# Patient Record
Sex: FEMALE | Race: WHITE | Employment: PART TIME | URBAN - METROPOLITAN AREA
[De-identification: names, ages, dates, MRNs, and addresses within clinical notes are randomized per-mention and may not be internally consistent; named-entity substitution may affect disease eponyms.]

---

## 2018-05-21 ENCOUNTER — TELEPHONE (OUTPATIENT)
Dept: SURGERY | Age: 63
End: 2018-05-21

## 2018-06-15 ENCOUNTER — TELEPHONE (OUTPATIENT)
Dept: SURGERY | Age: 63
End: 2018-06-15

## 2019-04-19 ENCOUNTER — APPOINTMENT (OUTPATIENT)
Dept: GENERAL RADIOLOGY | Age: 64
End: 2019-04-19

## 2019-04-19 ENCOUNTER — HOSPITAL ENCOUNTER (EMERGENCY)
Age: 64
Discharge: HOME | End: 2019-04-19

## 2019-04-19 ENCOUNTER — APPOINTMENT (OUTPATIENT)
Dept: ULTRASOUND IMAGING | Age: 64
End: 2019-04-19

## 2019-04-19 VITALS
HEART RATE: 74 BPM | BODY MASS INDEX: 32.02 KG/M2 | OXYGEN SATURATION: 96 % | HEIGHT: 67 IN | WEIGHT: 204 LBS | RESPIRATION RATE: 14 BRPM | DIASTOLIC BLOOD PRESSURE: 88 MMHG | SYSTOLIC BLOOD PRESSURE: 167 MMHG | TEMPERATURE: 99.1 F

## 2019-04-19 DIAGNOSIS — M25.461 KNEE EFFUSION, RIGHT: ICD-10-CM

## 2019-04-19 DIAGNOSIS — M25.561 ACUTE PAIN OF RIGHT KNEE: Primary | ICD-10-CM

## 2019-04-19 PROCEDURE — 99283 EMERGENCY DEPT VISIT LOW MDM: CPT

## 2019-04-19 PROCEDURE — 6370000000 HC RX 637 (ALT 250 FOR IP): Performed by: PHYSICIAN ASSISTANT

## 2019-04-19 PROCEDURE — 73562 X-RAY EXAM OF KNEE 3: CPT

## 2019-04-19 PROCEDURE — 93971 EXTREMITY STUDY: CPT

## 2019-04-19 RX ORDER — IBUPROFEN 600 MG/1
600 TABLET ORAL ONCE
Status: COMPLETED | OUTPATIENT
Start: 2019-04-19 | End: 2019-04-19

## 2019-04-19 RX ORDER — METOPROLOL SUCCINATE 25 MG/1
25 TABLET, EXTENDED RELEASE ORAL DAILY
COMMUNITY
End: 2022-04-10

## 2019-04-19 RX ORDER — NAPROXEN 375 MG/1
375 TABLET ORAL 2 TIMES DAILY
Qty: 14 TABLET | Refills: 0 | Status: SHIPPED | OUTPATIENT
Start: 2019-04-19 | End: 2022-09-29

## 2019-04-19 RX ADMIN — IBUPROFEN 600 MG: 600 TABLET, FILM COATED ORAL at 16:40

## 2019-04-19 ASSESSMENT — PAIN SCALES - GENERAL
PAINLEVEL_OUTOF10: 6
PAINLEVEL_OUTOF10: 6

## 2019-04-19 ASSESSMENT — PAIN DESCRIPTION - ORIENTATION: ORIENTATION: RIGHT

## 2019-04-19 ASSESSMENT — PAIN DESCRIPTION - LOCATION: LOCATION: KNEE

## 2019-04-19 ASSESSMENT — PAIN DESCRIPTION - PAIN TYPE: TYPE: ACUTE PAIN

## 2019-04-19 NOTE — ED PROVIDER NOTES
[facility]         Department of Emergency Medicine   ED  Provider Note  Admit Date/RoomTime: 4/19/2019  3:18 PM  ED Room: 37/37  Chief Complaint   Knee Pain (right, pain and swelling x2 weeks, no known injury)    History of Present Illness   Source of history provided by:  patient. History/Exam Limitations: none. Doreen Pinon is a 66 y.o. old female who has a past medical history of:   Past Medical History:   Diagnosis Date    GERD (gastroesophageal reflux disease)     Hyperlipidemia     no med    Hypertension     Hyperthyroidism     Obesity    presents to the emergency department by private vehicle, for Right knee pain and swelling which began 2 weeks ago. Denies injury. States she noticed the pain 2 weeks ago and it is worse with ambulation. She notes pain to the lateral aspect of the knee as well as posterior knee. She states over the past few days the pain has started to travel up into her thigh and down into her calf. She denies any recent travel, recent surgeries or injuries, cancer history, estrogen use. Patient is a smoker. She denies any history of DVT in the past. Denies any chest pain or shortness of breath. She has been taking some ibuprofen with some relief. ROS    Pertinent positives and negatives are stated within HPI, all other systems reviewed and are negative. Past Surgical History:   Procedure Laterality Date    ABDOMINAL HERNIA REPAIR      CHOLECYSTECTOMY      COLONOSCOPY      COLONOSCOPY  6/26/15    DR CHERISE Duke HERNIA REPAIR  [id number]    Umbilical    THYROID SURGERY     Social History:  reports that she has been smoking. She has a 12.00 pack-year smoking history. She has never used smokeless tobacco. She reports that she drinks about 7.2 oz of alcohol per week. She reports that she does not use drugs.   Family History: family history includes Arthritis in her father, mother, and sister; Cancer in her mother and sister; Diabetes in her mother; Heart Disease in her brother, father, mother, and sister; High Blood Pressure in her father and mother; High Cholesterol in her father, mother, and sister; Kidney Disease in her mother; Stroke in her father and sister; Substance Abuse in her mother. Allergies: Valium [diazepam]    Physical Exam           ED Triage Vitals [04/19/19 1516]   BP Temp Temp Source Pulse Resp SpO2 Height Weight   (!) 167/88 99.1 °F (37.3 °C) Oral 74 14 96 % 5' 7\" (1.702 m) 204 lb (92.5 kg)      Oxygen Saturation Interpretation: Normal.    · Constitutional:  Alert, development consistent with age. · HEENT:  NC/NT. Airway patent. · Neck:  Normal ROM. Supple. · Extremity(s):  Right:.                Tenderness:  Mild over the lateral knee and posterior joint. None into the calf or thigh                Swelling: Moderate nonpitting to the knee with mild to the calf and distal thigh              Calf:  No cords or calf tenderness. Deformity: No.                 ROM: full range of motion. Skin:  no erythema, rash or wounds noted. Neurovascular: Motor deficit: none. Sensory deficit: none. Pulse deficit: none. Capillary refill: normal.  · Lymphatics: No lymphangitis or adenopathy noted. · Neurological:  Oriented x3. Motor functions intact. Lab / Imaging Results   (All laboratory and radiology results have been personally reviewed by myself)  Labs:  No results found for this visit on 04/19/19. Imaging: All Radiology results interpreted by Radiologist unless otherwise noted. XR KNEE RIGHT (3 VIEWS)   Final Result   ALERT:  THIS IS AN ABNORMAL REPORT   1. Suspected bone island proximal right tibia. 2. Moderate sized right knee joint effusion. 3. No acute fracture identified. If there is persistent clinical pain   or symptomatology, a return to medical attention within 2-7 days and   further imaging is recommended.          US DUP LOWER EXTREMITY RIGHT JACQUELINE Final Result   PATENT DEEP VENOUS SYSTEM OF THE RIGHT LOWER EXTREMITY. NO EVIDENCE FOR DVT. ED Course / Medical Decision Making     Medications   ibuprofen (ADVIL;MOTRIN) tablet 600 mg (has no administration in time range)        Consults:   None    Procedure(s):   none    MDM:      Imaging were obtained based on suspicion for DVT vs arthritis/effusion. No DVT noted on ultrasound of patient's only risk factor for DVT was smoking. X-ray showing moderate effusion as well as a possible bony island. I discussed this with the patient and recommended PCP follow-up. Plan is subsequently for symptom control, limited use and  immobilization with appropriate outpatient follow-up. Counseling: The emergency provider has spoken with the patient and discussed todays results, in addition to providing specific details for the plan of care and counseling regarding the diagnosis and prognosis. Questions are answered at this time and they are agreeable with the plan. Assessment      1. Acute pain of right knee    2. Knee effusion, right      Plan   Discharge to home  Patient condition is stable    New Medications     New Prescriptions    NAPROXEN (NAPROSYN) 375 MG TABLET    Take 1 tablet by mouth 2 times daily for 7 days With food and full glass of water     Electronically signed by Balbina Martin PA-C   DD: 4/19/19  **This report was transcribed using voice recognition software. Every effort was made to ensure accuracy; however, inadvertent computerized transcription errors may be present.   END OF ED PROVIDER NOTE        Rl Jara PA-C  04/19/19 0874

## 2022-04-10 ENCOUNTER — HOSPITAL ENCOUNTER (EMERGENCY)
Age: 67
Discharge: HOME | End: 2022-04-10

## 2022-04-10 VITALS
HEART RATE: 76 BPM | WEIGHT: 196 LBS | OXYGEN SATURATION: 97 % | HEIGHT: 68 IN | TEMPERATURE: 98.2 F | DIASTOLIC BLOOD PRESSURE: 98 MMHG | RESPIRATION RATE: 14 BRPM | SYSTOLIC BLOOD PRESSURE: 148 MMHG | BODY MASS INDEX: 29.7 KG/M2

## 2022-04-10 DIAGNOSIS — L29.9 PRURITIC DISORDER: Primary | ICD-10-CM

## 2022-04-10 PROCEDURE — 99283 EMERGENCY DEPT VISIT LOW MDM: CPT

## 2022-04-10 RX ORDER — PERMETHRIN 50 MG/G
CREAM TOPICAL
Qty: 60 G | Refills: 1 | Status: SHIPPED | OUTPATIENT
Start: 2022-04-10 | End: 2022-09-29

## 2022-04-10 RX ORDER — HYDROXYZINE PAMOATE 25 MG/1
25 CAPSULE ORAL 3 TIMES DAILY PRN
Qty: 30 CAPSULE | Refills: 0 | Status: SHIPPED | OUTPATIENT
Start: 2022-04-10 | End: 2022-04-20

## 2022-04-10 NOTE — ED PROVIDER NOTES
[facility]  Department of Emergency Medicine   ED  Encounter Note  Admit Date/RoomTime: 4/10/2022  8:30 AM  ED Room: 34/34  NAME: Liv Howard  : 1955  MRN: [id number]     Chief Complaint:  Pruritis (all over body jagruti scalp inside nose and mouth No visible hives or rash Itching since Oct 2021)    HISTORY OF PRESENT ILLNESS        Liv Howard is a 61 y.o. female who presents to the ED with a complaint of itching, rash thinking that she has a parasite or worm. Patient states since 2021 she has felt that she has been exposed to a parasite or worm. States it all started being extremely itchy in the nose. When she would look up her nose she saw a bunch of white spots. Then it traveled to her throat. She sees red lines and white lines to her inner cheeks and back of her throat that look like worms to her. Then she notes red lines on her skin that are extremely itchy. She states that she is now like itching all over. It keeps her up at night. It is driving her crazy. She has seen her PCP at least 3 times. She has taken albendazole in January without relief. She has also been on doxycycline without relief. Patient states the doctor did do a stool ova and parasites that came back negative. Patient states she is taking over-the-counter Diatomaceous earth that is helping all the itching. Patient states she is concerned about being around family members and spreading this worm or parasite. She states her daughter was recently diagnosed with cancer and has a lowered immune system. Symptoms are moderate in severity with the itching being her main complaint. ROS   Pertinent positives and negatives are stated within HPI, all other systems reviewed and are negative. Past Medical History:  has a past medical history of GERD (gastroesophageal reflux disease), Hyperlipidemia, Hypertension, Hyperthyroidism, and Obesity.     Surgical History:  has a past surgical history that includes Thyroid surgery; Cholecystectomy; Abdominal hernia repair; hernia repair (2008); Colonoscopy; and Colonoscopy (6/26/15). Social History:  reports that she has been smoking. She has a 12.00 pack-year smoking history. She has never used smokeless tobacco. She reports current alcohol use of about 12.0 standard drinks of alcohol per week. She reports that she does not use drugs. Family History: family history includes Arthritis in her father, mother, and sister; Cancer in her mother and sister; Diabetes in her mother; Heart Disease in her brother, father, mother, and sister; High Blood Pressure in her father and mother; High Cholesterol in her father, mother, and sister; Kidney Disease in her mother; Stroke in her father and sister; Substance Abuse in her mother. Allergies: Valium [diazepam]    PHYSICAL EXAM   Oxygen Saturation Interpretation: Normal on room air analysis. ED Triage Vitals [04/10/22 0825]   BP Temp Temp Source Pulse Resp SpO2 Height Weight   (!) 212/101 98.2 °F (36.8 °C) Temporal 80 14 97 % 5' 8\" (1.727 m) 196 lb (88.9 kg)       General:  NAD. Alert and Oriented. Well-appearing. Skin:  Warm, dry. No rashes. No linear lesions. Negative hives. Negative blisters, negative pustules, negative vesicles. Head:  Normocephalic. Atraumatic. Eyes:  EOMI. Conjunctiva normal.  ENT:  Oral mucosa moist.  Airway patent. Posterior pharynx with moderate erythema to the tonsillar arches. There is no diffuse erythema. Negative exudate. I do not appreciate any lesions, sores. Bilateral nasal turbinates with moderate swelling, right greater than left. There is some diffuse erythema throughout the nasal passages. Patient does admit that she rubs her nose and blows her nose a lot because of all the itching. Neck:  Supple. Normal ROM. Respiratory:  No respiratory distress. No labored breathing.   Lungs clear without rales, rhonchi or wheezing. Cardiovascular:  Regular rate. No peripheral edema. Extremities warm and good color. Extremities:  Normal ROM. Nontender to palpation. Atraumatic. Back:  Normal ROM. Nontender to palpation. Neuro:  Alert and Oriented to person, place, time and situation. Normal LOC. Moves all extremities. Speech fluent. Psych:  Calm and Cooperative. Normal thought process. Normal judgement. Lab / Imaging Results   (All laboratory and radiology results have been personally reviewed by myself)  Labs:  No results found for this visit on 04/10/22. Imaging: All Radiology results interpreted by Radiologist unless otherwise noted. No orders to display       ED Course / Medical Decision Making   Medications - No data to display     Re-examination:  4/10/22       Time: 0915 blood pressure much better on recheck 148/98  Patients condition . Consult(s):   None    Procedure(s):   none    MDM:   After discussion with patient we have decided that I will prescribe Elimite cream and Atarax. She is again to follow-up with her family doctor to discuss all these concerns. If she does not feel satisfied with family doctors work-up and/or treatment she is to request to see a specialist.    Plan of Care/Counseling:  Physician Assistant on duty reviewed today's visit with the patient in addition to providing specific details for the plan of care and counseling regarding the diagnosis and prognosis. Questions are answered at this time and are agreeable with the plan. ASSESSMENT     1. Pruritic disorder New Problem     PLAN   Discharged home. Patient condition is good    New Medications     New Prescriptions    HYDROXYZINE (VISTARIL) 25 MG CAPSULE    Take 1 capsule by mouth 3 times daily as needed for Itching    PERMETHRIN (ELIMITE) 5 % CREAM    Massage on skin over entire body and remove 8-12hrs later. Do not leave on longer than 12 hrs. May re-apply in 2 weeks if not resolved.  May Substitute Acticin for Elimite brand     Electronically signed by Claudina Osler, PA   DD: 4/10/22  **This report was transcribed using voice recognition software. Every effort was made to ensure accuracy; however, inadvertent computerized transcription errors may be present.   END OF ED PROVIDER NOTE       Claudina Osler, [place]  04/10/22 [address] [place] [address]  04/10/22 0918

## 2022-04-10 NOTE — ED NOTES
States she has seen her PCP about itching.  States her daughter was recently diagnosed with cancer and she wants to make sure she is not contagious     Sakshi Carballo RN  04/10/22 0024

## 2022-09-15 ENCOUNTER — TELEPHONE (OUTPATIENT)
Dept: FAMILY MEDICINE CLINIC | Age: 67
End: 2022-09-15

## 2022-09-15 NOTE — TELEPHONE ENCOUNTER
Pt called requesting a new pt appt with you and I advised her I did not think you are accepting new pt's at this time. She states Tal Coleman ( a friend of hers) told her you would accept her as a new pt. I did not see any documentation anywhere stating this information.  Please advise

## 2022-09-16 NOTE — TELEPHONE ENCOUNTER
Spoke with Santos Chahal; Establishing appt scheduled for Thursday 9/29 at 8:30. Explained to arrive 10-15 mins before scheduled appt; bring id/ins card/copay & wear mask. Date, time & location confirmed with pt.

## 2022-09-29 ENCOUNTER — OFFICE VISIT (OUTPATIENT)
Dept: FAMILY MEDICINE CLINIC | Age: 67
End: 2022-09-29
Payer: MEDICARE

## 2022-09-29 VITALS
SYSTOLIC BLOOD PRESSURE: 128 MMHG | HEART RATE: 78 BPM | BODY MASS INDEX: 29.71 KG/M2 | DIASTOLIC BLOOD PRESSURE: 88 MMHG | WEIGHT: 200.62 LBS | HEIGHT: 69 IN | TEMPERATURE: 97.9 F | OXYGEN SATURATION: 98 %

## 2022-09-29 VITALS
SYSTOLIC BLOOD PRESSURE: 128 MMHG | TEMPERATURE: 97.9 F | HEIGHT: 69 IN | OXYGEN SATURATION: 98 % | DIASTOLIC BLOOD PRESSURE: 88 MMHG | WEIGHT: 200.6 LBS | BODY MASS INDEX: 29.71 KG/M2 | HEART RATE: 78 BPM

## 2022-09-29 DIAGNOSIS — E55.9 VITAMIN D DEFICIENCY: ICD-10-CM

## 2022-09-29 DIAGNOSIS — F33.0 MILD EPISODE OF RECURRENT MAJOR DEPRESSIVE DISORDER (HCC): Primary | ICD-10-CM

## 2022-09-29 DIAGNOSIS — R73.01 IMPAIRED FASTING BLOOD SUGAR: ICD-10-CM

## 2022-09-29 DIAGNOSIS — E03.9 ACQUIRED HYPOTHYROIDISM: ICD-10-CM

## 2022-09-29 DIAGNOSIS — Z12.11 SCREEN FOR COLON CANCER: ICD-10-CM

## 2022-09-29 DIAGNOSIS — Z12.31 ENCOUNTER FOR SCREENING MAMMOGRAM FOR BREAST CANCER: ICD-10-CM

## 2022-09-29 DIAGNOSIS — Z00.00 WELCOME TO MEDICARE PREVENTIVE VISIT: Primary | ICD-10-CM

## 2022-09-29 DIAGNOSIS — E78.2 MIXED HYPERLIPIDEMIA: ICD-10-CM

## 2022-09-29 DIAGNOSIS — L29.9 PRURITUS: ICD-10-CM

## 2022-09-29 DIAGNOSIS — G47.33 OBSTRUCTIVE SLEEP APNEA SYNDROME: ICD-10-CM

## 2022-09-29 PROBLEM — F33.1 MAJOR DEPRESSIVE DISORDER, RECURRENT, MODERATE (HCC): Status: ACTIVE | Noted: 2022-09-29

## 2022-09-29 PROBLEM — F33.9 MAJOR DEPRESSIVE DISORDER, RECURRENT, UNSPECIFIED (HCC): Status: ACTIVE | Noted: 2022-09-29

## 2022-09-29 PROCEDURE — 99215 OFFICE O/P EST HI 40 MIN: CPT | Performed by: FAMILY MEDICINE

## 2022-09-29 PROCEDURE — G0402 INITIAL PREVENTIVE EXAM: HCPCS | Performed by: FAMILY MEDICINE

## 2022-09-29 PROCEDURE — 1123F ACP DISCUSS/DSCN MKR DOCD: CPT | Performed by: FAMILY MEDICINE

## 2022-09-29 PROCEDURE — 3288F FALL RISK ASSESSMENT DOCD: CPT | Performed by: FAMILY MEDICINE

## 2022-09-29 RX ORDER — LEVOTHYROXINE SODIUM 137 UG/1
TABLET ORAL
COMMUNITY
Start: 2022-09-14 | End: 2022-10-04 | Stop reason: SDUPTHER

## 2022-09-29 RX ORDER — DULOXETIN HYDROCHLORIDE 30 MG/1
30 CAPSULE, DELAYED RELEASE ORAL DAILY
Qty: 30 CAPSULE | Refills: 2 | Status: SHIPPED | OUTPATIENT
Start: 2022-09-29

## 2022-09-29 RX ORDER — DULOXETIN HYDROCHLORIDE 30 MG/1
30 CAPSULE, DELAYED RELEASE ORAL DAILY
Qty: 30 CAPSULE | Refills: 2 | Status: SHIPPED
Start: 2022-09-29 | End: 2022-09-29

## 2022-09-29 ASSESSMENT — PATIENT HEALTH QUESTIONNAIRE - PHQ9
3. TROUBLE FALLING OR STAYING ASLEEP: 3
10. IF YOU CHECKED OFF ANY PROBLEMS, HOW DIFFICULT HAVE THESE PROBLEMS MADE IT FOR YOU TO DO YOUR WORK, TAKE CARE OF THINGS AT HOME, OR GET ALONG WITH OTHER PEOPLE: 1
SUM OF ALL RESPONSES TO PHQ QUESTIONS 1-9: 7
5. POOR APPETITE OR OVEREATING: 0
2. FEELING DOWN, DEPRESSED OR HOPELESS: 1
1. LITTLE INTEREST OR PLEASURE IN DOING THINGS: 2
SUM OF ALL RESPONSES TO PHQ QUESTIONS 1-9: 7
7. TROUBLE CONCENTRATING ON THINGS, SUCH AS READING THE NEWSPAPER OR WATCHING TELEVISION: 1
SUM OF ALL RESPONSES TO PHQ QUESTIONS 1-9: 7
8. MOVING OR SPEAKING SO SLOWLY THAT OTHER PEOPLE COULD HAVE NOTICED. OR THE OPPOSITE, BEING SO FIGETY OR RESTLESS THAT YOU HAVE BEEN MOVING AROUND A LOT MORE THAN USUAL: 0
9. THOUGHTS THAT YOU WOULD BE BETTER OFF DEAD, OR OF HURTING YOURSELF: 0
SUM OF ALL RESPONSES TO PHQ QUESTIONS 1-9: 7
4. FEELING TIRED OR HAVING LITTLE ENERGY: 0
6. FEELING BAD ABOUT YOURSELF - OR THAT YOU ARE A FAILURE OR HAVE LET YOURSELF OR YOUR FAMILY DOWN: 0
SUM OF ALL RESPONSES TO PHQ9 QUESTIONS 1 & 2: 3

## 2022-09-29 ASSESSMENT — ENCOUNTER SYMPTOMS
NAUSEA: 0
SINUS PAIN: 0
BACK PAIN: 0
ABDOMINAL PAIN: 0
SHORTNESS OF BREATH: 0
TROUBLE SWALLOWING: 0
CONSTIPATION: 0
SORE THROAT: 0
WHEEZING: 0
CHEST TIGHTNESS: 0
COUGH: 0
DIARRHEA: 0
VOMITING: 0
EYE PAIN: 0

## 2022-09-29 ASSESSMENT — LIFESTYLE VARIABLES
HOW OFTEN DURING THE LAST YEAR HAVE YOU FAILED TO DO WHAT WAS NORMALLY EXPECTED FROM YOU BECAUSE OF DRINKING: 0
HAS A RELATIVE, FRIEND, DOCTOR, OR ANOTHER HEALTH PROFESSIONAL EXPRESSED CONCERN ABOUT YOUR DRINKING OR SUGGESTED YOU CUT DOWN: 0
HOW OFTEN DURING THE LAST YEAR HAVE YOU HAD A FEELING OF GUILT OR REMORSE AFTER DRINKING: 0
HOW OFTEN DURING THE LAST YEAR HAVE YOU FOUND THAT YOU WERE NOT ABLE TO STOP DRINKING ONCE YOU HAD STARTED: 0
HAVE YOU OR SOMEONE ELSE BEEN INJURED AS A RESULT OF YOUR DRINKING: 0
HOW OFTEN DURING THE LAST YEAR HAVE YOU BEEN UNABLE TO REMEMBER WHAT HAPPENED THE NIGHT BEFORE BECAUSE YOU HAD BEEN DRINKING: 0
HOW OFTEN DO YOU HAVE A DRINK CONTAINING ALCOHOL: 2-3 TIMES A WEEK
HOW OFTEN DURING THE LAST YEAR HAVE YOU NEEDED AN ALCOHOLIC DRINK FIRST THING IN THE MORNING TO GET YOURSELF GOING AFTER A NIGHT OF HEAVY DRINKING: 0
HOW MANY STANDARD DRINKS CONTAINING ALCOHOL DO YOU HAVE ON A TYPICAL DAY: 3 OR 4

## 2022-09-29 NOTE — PROGRESS NOTES
Medicare Annual Wellness Visit    Arnold Pace is here for Medicare AWV    Assessment & Plan   Welcome to Medicare preventive visit    Recommendations for Preventive Services Due: see orders and patient instructions/AVS.  Recommended screening schedule for the next 5-10 years is provided to the patient in written form: see Patient Instructions/AVS.     Return for Medicare Annual Wellness Visit in 1 year. Subjective   The following acute and/or chronic problems were also addressed today:  Screenings, vaccines, past medical history    Patient's complete Health Risk Assessment and screening values have been reviewed and are found in Flowsheets. The following problems were reviewed today and where indicated follow up appointments were made and/or referrals ordered. Positive Risk Factor Screenings with Interventions:      Depression:   Screening done PHQ9 score 7- 9  She would like treatment, she has been under a great deal of stress  We discussed medicaitons at length, will start with cymbalta    Severity:1-4 = minimal depression, 5-9 = mild depression, 10-14 = moderate depression, 15-19 = moderately severe depression, 20-27 = severe depression  Depression Interventions:  Medication prescribed- cymbalta    Tobacco Use:  Tobacco Use: High Risk    Smoking Tobacco Use: Some Days    Smokeless Tobacco Use: Never     E-cigarette/Vaping       Questions Responses    E-cigarette/Vaping Use     Start Date     Passive Exposure     Quit Date     Counseling Given     Comments           Substance Use - Tobacco Interventions:  patient is not ready to work toward tobacco cessation at this time    Alcohol Screening:  Alcohol Use: Heavy Drinker    Frequency of Alcohol Consumption: 2-3 times a week    Average Number of Drinks: 3 or 4    Frequency of Binge Drinking: Never     AUDIT-C Score: 4  AUDIT Total Score: 4  An AUDIT-C score of 3-7 indicates potential alcohol risk.    An AUDIT Total score of 8 or more is associated with harmful or hazardous drinking. A score of 13 or more in women, and 15 or more in men, is likely to indicate alcohol dependence. Substance Use - Alcohol Interventions:  Reviewed recommended alcohol consumption guidelines with the patient, Patient is not ready to change his/her alcohol consumption behavior at this time                   Objective   Vitals:    09/29/22 0959   BP: 128/88   Pulse: 78   Temp: 97.9 °F (36.6 °C)   SpO2: 98%   Weight: 200 lb 9.9 oz (91 kg)   Height: 5' 9\" (1.753 m)      Body mass index is 29.63 kg/m². General Appearance: alert and oriented to person, place and time, well developed and well- nourished, in no acute distress  Skin: warm and dry, no rash or erythema  Head: normocephalic and atraumatic  Eyes: pupils equal, round, and reactive to light, extraocular eye movements intact, conjunctivae normal  ENT: tympanic membrane, external ear and ear canal normal bilaterally, nose without deformity, nasal mucosa and turbinates normal without polyps  Neck: supple and non-tender without mass, no thyromegaly or thyroid nodules, no cervical lymphadenopathy  Pulmonary/Chest: clear to auscultation bilaterally- no wheezes, rales or rhonchi, normal air movement, no respiratory distress  Cardiovascular: normal rate, regular rhythm, normal S1 and S2, no murmurs, rubs, clicks, or gallops, distal pulses intact, no carotid bruits  Abdomen: soft, non-tender, non-distended, normal bowel sounds, no masses or organomegaly  Extremities: no cyanosis, clubbing or edema  Musculoskeletal: normal range of motion, no joint swelling, deformity or tenderness  Neurologic: reflexes normal and symmetric, no cranial nerve deficit, gait, coordination and speech normal       Allergies   Allergen Reactions    Dexter Thyroid [Thyroid] Myalgia and Hallucinations    Valium [Diazepam] Nausea Only     Prior to Visit Medications    Medication Sig Taking?  Authorizing Provider   levothyroxine (SYNTHROID) 137 MCG tablet take 1 tablet by mouth once daily  Historical Provider, MD   DULoxetine (CYMBALTA) 30 MG extended release capsule Take 1 capsule by mouth daily  Eli Jackson DO       CareTeam (Including outside providers/suppliers regularly involved in providing care):   Patient Care Team:  Eli Jackson DO as PCP - General (Family Medicine)     Reviewed and updated this visit:  Tobacco  Allergies  Meds  Problems  Med Hx  Surg Hx  Soc Hx  Fam Hx

## 2022-09-29 NOTE — PATIENT INSTRUCTIONS
Personalized Preventive Plan for Angelito Pink - 9/29/2022  Medicare offers a range of preventive health benefits. Some of the tests and screenings are paid in full while other may be subject to a deductible, co-insurance, and/or copay. Some of these benefits include a comprehensive review of your medical history including lifestyle, illnesses that may run in your family, and various assessments and screenings as appropriate. After reviewing your medical record and screening and assessments performed today your provider may have ordered immunizations, labs, imaging, and/or referrals for you. A list of these orders (if applicable) as well as your Preventive Care list are included within your After Visit Summary for your review. Other Preventive Recommendations:    A preventive eye exam performed by an eye specialist is recommended every 1-2 years to screen for glaucoma; cataracts, macular degeneration, and other eye disorders. A preventive dental visit is recommended every 6 months. Try to get at least 150 minutes of exercise per week or 10,000 steps per day on a pedometer . Order or download the FREE \"Exercise & Physical Activity: Your Everyday Guide\" from The "[employer]. Call 4-284.918.3460 or search The [employer] online. You need 7436-1462 mg of calcium and 4125-9277 IU of vitamin D per day. It is possible to meet your calcium requirement with diet alone, but a vitamin D supplement is usually necessary to meet this goal.  When exposed to the sun, use a sunscreen that protects against both UVA and UVB radiation with an SPF of 30 or greater. Reapply every 2 to 3 hours or after sweating, drying off with a towel, or swimming. Always wear a seat belt when traveling in a car. Always wear a helmet when riding a bicycle or motorcycle.

## 2022-09-29 NOTE — PROGRESS NOTES
22    Name: Mahad Cano  :1955   Sex:female   Age:67 y.o. Chief Complaint   Patient presents with    Establish Care     Patient presents to office to establish with provider. Patient's main complaint is itchiness. She is itchy all over her body for more than a year now. 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 She has been on premerthrin, ivermectin, pinworm, albendazole, hydroxizine, and doxycycline. Patient says none of the medications have worked. She has four daughters and one of them has cancer and she is afraid to be around her because she doesn't know what is causing the itchiness. Patient is on thyroid medication and she says the dosing was not correct for sometime. She had her thyroid checked three months ago. Patient is fasting this morning. She used to have CPAP machine, but noticed she was getting a lot of upper respiratory infections, so she stopped using the machine. Patient says she is a bit depressed, she denies any suicidal thoughts. Patient says she has recently started to feel as if she may pass out randomly. Patient says she doesn't feel dizzy, she will just suddenly feel as if she may pass out and has to grab something to steady herself. She says her teeth have started breaking and have become very brittle recently as well.      Here to establish today  There are a few issues she has today    She has this chronic skin itching  It started a year ago  She thought she had a parasite or a mite and was treated extensively with numerous treatments  Still itches even though there is no rash  It is possible it is her levothyroxine  She did not tolerate armour thyroid-it caused hallucinations and was on tirosint but that was changed due to cost about 6 months before the itching skin started  Will get labs and may switch to tirosint, also refer to derm for an opinion    She is due for colonosopcy  Will refer to DR Jazmyne Lagos, she does not want to go back to DR Tia Paredes due it has been 3 years since she had one    Stress and anzeity is an issue right now  She has been under a lot of stress  Her daughter was recently diagnosed with breast cancer at the age of 39  She would like treated if possible  We discussed different meds  Will try cymbalta 30mg daily    History of HTN  But has not been on meds for a few years  Bp has been better  Will monitor it    Hx of high lipids  Has not been treated  Will see what her labs show and then go from there    Tobacco use for many years  Did advise she try to quit  Also alcohol use, needs to be careful with this  It will increase her risk of falls, dizziness can affect her liver        Review of Systems   Constitutional:  Negative for appetite change, fatigue and fever. HENT:  Negative for congestion, ear pain, sinus pain, sore throat and trouble swallowing. Eyes:  Negative for pain. Respiratory:  Negative for cough, chest tightness, shortness of breath and wheezing. Cardiovascular:  Negative for chest pain, palpitations and leg swelling. Gastrointestinal:  Negative for abdominal pain, constipation, diarrhea, nausea and vomiting. Endocrine: Negative for cold intolerance and heat intolerance. Genitourinary:  Negative for difficulty urinating, hematuria and pelvic pain. Musculoskeletal:  Negative for arthralgias, back pain, gait problem, joint swelling and myalgias. Skin:  Negative for rash and wound. Neurological:  Negative for dizziness, syncope, light-headedness and headaches. Hematological:  Negative for adenopathy. Psychiatric/Behavioral:  Positive for dysphoric mood. Negative for confusion, self-injury, sleep disturbance and suicidal ideas. The patient is not nervous/anxious.           Current Outpatient Medications:     DULoxetine (CYMBALTA) 30 MG extended release capsule, Take 1 capsule by mouth daily, Disp: 30 capsule, Rfl: 2    levothyroxine (SYNTHROID) 137 MCG tablet, take 1 tablet by mouth once daily, Disp: , Rfl:   Allergies   Allergen Reactions    [place] Thyroid [Thyroid] Myalgia and Hallucinations    Valium [Diazepam] Nausea Only      Past Medical History:   Diagnosis Date    GERD (gastroesophageal reflux disease)     Hyperlipidemia     no med    Hypertension     Hypothyroidism     Obesity     Sleep apnea     stopped using machine in 2020     Patient Active Problem List    Diagnosis Date Noted    Major depressive disorder, recurrent, moderate 09/29/2022    Major depressive disorder, recurrent, unspecified 09/29/2022    Pruritus 09/29/2022    Obstructive sleep apnea syndrome 09/29/2022    Mixed hyperlipidemia 09/29/2022    Acquired hypothyroidism 09/29/2022    Tubular adenoma of colon 07/22/2015    Recurrent ventral incisional hernia 05/11/2015    Family history of colon cancer 05/11/2015      Past Surgical History:   Procedure Laterality Date    ABDOMINAL HERNIA REPAIR      CHOLECYSTECTOMY      COLONOSCOPY      COLONOSCOPY  6/26/15    DR LUONG    HERNIA REPAIR  [id number]    Umbilical    THYROID SURGERY        Social History       Tobacco History       Smoking Status  Some Days Smoking Start Date  2002 Smoking Frequency  2.00 packs/day for 20.00 years (40.00 pk-yrs) Smoking Tobacco Type  Cigarettes since 2002      Smokeless Tobacco Use  Never              Alcohol History       Alcohol Use Status  Yes Drinks/Week  12 Shots of liquor per week Amount  12.0 standard drinks of alcohol/wk Comment  2-3 x week              Drug Use       Drug Use Status  No              Sexual Activity       Sexually Active  Yes                /88   Pulse 78   Temp 97.9 °F (36.6 °C)   Ht 5' 9\" (1.753 m)   Wt 200 lb 9.6 oz (91 kg)   LMP 09/29/1992 (Approximate)   SpO2 98%   BMI 29.62 kg/m²     EXAM:   Physical Exam  Vitals and nursing note reviewed. Constitutional:       General: She is not in acute distress. Appearance: She is well-developed.  She is not ill-appearing. HENT:      Head: Normocephalic and atraumatic. Right Ear: Tympanic membrane normal.      Left Ear: Tympanic membrane normal.      Nose: Nose normal.      Mouth/Throat:      Mouth: Mucous membranes are moist.   Eyes:      Pupils: Pupils are equal, round, and reactive to light. Cardiovascular:      Rate and Rhythm: Normal rate and regular rhythm. Heart sounds: Murmur heard. Pulmonary:      Effort: Pulmonary effort is normal.      Breath sounds: Normal breath sounds. No wheezing or rhonchi. Abdominal:      General: Bowel sounds are normal.      Palpations: Abdomen is soft. Musculoskeletal:      Cervical back: Normal range of motion. Comments: Gait steady   Skin:     General: Skin is warm and dry. Findings: No lesion or rash. Neurological:      Mental Status: She is alert and oriented to person, place, and time. Mental status is at baseline. Psychiatric:         Mood and Affect: Mood normal.         Thought Content: Thought content normal.        Dacri Crew was seen today for establish care. Diagnoses and all orders for this visit:    Mild episode of recurrent major depressive disorder ([facility] [address]  Comments:  under a lot of stress right now  would like meds, her daughter was just diagnosed with breast CA at the age of 39  try cymbalta, recheck in a few wks  Orders:  -     DULoxetine (CYMBALTA) 30 MG extended release capsule;  Take 1 capsule by mouth daily    Pruritus  Comments:  possibly from levothyroxine  no parasite infection-was treated  refer to Dermatology  Orders:  -     Gilma Burgos MD,  Dermatology, [facility]    Obstructive sleep apnea syndrome  Comments:  was on cpap till 2 yrs ago due to recurrent URI  refer back to sleep medicine  Orders:  -     Kim Avila MD, Sleep Medicine, [address] [place] [address] for colon cancer  Comments:  refer to general surgery for colonoscopy  Orders:  -     Helga Saleem MD, General Surgery, [facility]    Encounter for screening mammogram for breast cancer  Comments:  ordered she is due, it has been 2 years  Orders:  -     JULIAN JASON DIGITAL SCREEN BILATERAL PER PROTOCOL; Future    Mixed hyperlipidemia  Comments:  history of high lipids  needs labs done today, she agrees  Orders:  -     CBC with Auto Differential; Future  -     Comprehensive Metabolic Panel; Future  -     Lipid Panel; Future    Acquired hypothyroidism  Comments:  was on tirosint & did well  but now on levothyroxine  possibly causing her pruritis  needs labs today  Orders:  -     TSH; Future  -     T4, Free; Future  -     THYROID PEROXIDASE ANTIBODY; Future    Vitamin D deficiency  -     Vitamin D 25 Hydroxy; Future    Impaired fasting blood sugar  -     Hemoglobin A1C; Future  -     Microalbumin, Ur; Future    Other orders  -     Discontinue: DULoxetine (CYMBALTA) 30 MG extended release capsule; Take 1 capsule by mouth daily    Spent 45 minutes with patient today  Establishing  Past medical history reviewed, screenings reviewed  Labs to be done  Mammogram ordered  Referrals placed      I independently reviewed and updated the chief complaint, HPI, past medical and surgical history, medications, allergies and ROS as entered by the LPN. Seen by:   Jeni Henriquez DO

## 2022-10-04 DIAGNOSIS — E03.9 ACQUIRED HYPOTHYROIDISM: ICD-10-CM

## 2022-10-04 DIAGNOSIS — Z88.9 PERSONAL HISTORY OF ALLERGY TO MEDICINAL AGENT: Primary | ICD-10-CM

## 2022-10-04 RX ORDER — LEVOTHYROXINE SODIUM 125 UG/1
125 CAPSULE ORAL DAILY
Qty: 30 CAPSULE | Refills: 1 | Status: SHIPPED | OUTPATIENT
Start: 2022-10-04

## 2022-10-04 RX ORDER — LEVOTHYROXINE SODIUM 0.12 MG/1
TABLET ORAL
Qty: 90 TABLET | Refills: 1 | Status: SHIPPED
Start: 2022-10-04 | End: 2022-10-04 | Stop reason: SINTOL

## 2022-11-06 ASSESSMENT — PATIENT HEALTH QUESTIONNAIRE - PHQ9
2. FEELING DOWN, DEPRESSED OR HOPELESS: 0
1. LITTLE INTEREST OR PLEASURE IN DOING THINGS: 1
2. FEELING DOWN, DEPRESSED OR HOPELESS: NOT AT ALL
SUM OF ALL RESPONSES TO PHQ9 QUESTIONS 1 & 2: 1
SUM OF ALL RESPONSES TO PHQ QUESTIONS 1-9: 1
SUM OF ALL RESPONSES TO PHQ QUESTIONS 1-9: 1
1. LITTLE INTEREST OR PLEASURE IN DOING THINGS: SEVERAL DAYS
SUM OF ALL RESPONSES TO PHQ QUESTIONS 1-9: 1
SUM OF ALL RESPONSES TO PHQ9 QUESTIONS 1 & 2: 1
SUM OF ALL RESPONSES TO PHQ QUESTIONS 1-9: 1

## 2022-11-08 ENCOUNTER — OFFICE VISIT (OUTPATIENT)
Dept: FAMILY MEDICINE CLINIC | Age: 67
End: 2022-11-08
Payer: MEDICARE

## 2022-11-08 VITALS
OXYGEN SATURATION: 97 % | DIASTOLIC BLOOD PRESSURE: 86 MMHG | BODY MASS INDEX: 30.21 KG/M2 | HEIGHT: 69 IN | TEMPERATURE: 98.6 F | WEIGHT: 204 LBS | SYSTOLIC BLOOD PRESSURE: 138 MMHG | HEART RATE: 70 BPM

## 2022-11-08 DIAGNOSIS — E03.9 ACQUIRED HYPOTHYROIDISM: Primary | ICD-10-CM

## 2022-11-08 DIAGNOSIS — E78.2 MIXED HYPERLIPIDEMIA: ICD-10-CM

## 2022-11-08 DIAGNOSIS — F33.1 MAJOR DEPRESSIVE DISORDER, RECURRENT, MODERATE (HCC): ICD-10-CM

## 2022-11-08 DIAGNOSIS — Z72.0 TOBACCO USE: ICD-10-CM

## 2022-11-08 DIAGNOSIS — R73.03 PREDIABETES: ICD-10-CM

## 2022-11-08 PROCEDURE — 1123F ACP DISCUSS/DSCN MKR DOCD: CPT | Performed by: FAMILY MEDICINE

## 2022-11-08 PROCEDURE — 99214 OFFICE O/P EST MOD 30 MIN: CPT | Performed by: FAMILY MEDICINE

## 2022-11-08 RX ORDER — ACETAMINOPHEN 160 MG
TABLET,DISINTEGRATING ORAL 2 TIMES DAILY
COMMUNITY

## 2022-11-08 RX ORDER — LEVOTHYROXINE SODIUM 0.12 MG/1
TABLET ORAL
COMMUNITY
Start: 2022-10-04

## 2022-11-08 ASSESSMENT — ENCOUNTER SYMPTOMS
EYE PAIN: 0
TROUBLE SWALLOWING: 0
CONSTIPATION: 0
SHORTNESS OF BREATH: 0
ABDOMINAL PAIN: 0
NAUSEA: 0
COUGH: 0
BACK PAIN: 1
SORE THROAT: 0
WHEEZING: 0
DIARRHEA: 0
VOMITING: 0
CHEST TIGHTNESS: 0
SINUS PAIN: 0

## 2022-11-08 NOTE — PROGRESS NOTES
22    Name: Deven Nieto  :1955   Sex:female   Age:67 y.o. Chief Complaint   Patient presents with    Pruritis    Back Pain     Patient presents to office for visit. She never started the Tirosint because her insurance would not cover it. Patient is taking the Levothyroxine 125 mcg. She never started the cymbalta because the itchiness had not improved, so she did not want to start a new medication. Patient says she is gaining weight like crazy. Patient's back is painful, she says she threw her back out. Here for a recheck  Labs were done at last office visit and revealed multiple issues    Thyroid is being over treated so we decreased her dose to 125mcg  She is okay with this nad the lower dose has helped depression a little    Vitamin d is also low  Will continue supplement b/c it has been helping    Predibetes on labbs  We reviewed diet, she has a sweet tooth and like her carbs, eating a lot of carbs, tends to feel tired mid day  Gaining weight  Also still drinking rum and diet coke at night  None of these will help    Blood proessures have been elevated off and on  She really should be checking this at home  Smoking is likely a certain cause of this'  Try to cut back on tobacco use  Healthier diet, mediterranean and more exercise  She is very sedentary for the last 2 years and not inproving this at all    Also still some pruritis  Has a derm appt next month  But could not get tirosint due to cost    Depression still there  She agreed to try the cymbalta and if the pruritis is from her anxeity this may axtually help'will have her take it in the mornings due to the drinking at night      Review of Systems   Constitutional:  Negative for appetite change, fatigue and fever. HENT:  Negative for congestion, ear pain, sinus pain, sore throat and trouble swallowing. Eyes:  Negative for pain. Respiratory:  Negative for cough, chest tightness, shortness of breath and wheezing. Cardiovascular:  Negative for chest pain, palpitations and leg swelling. Gastrointestinal:  Negative for abdominal pain, constipation, diarrhea, nausea and vomiting. Endocrine: Negative for cold intolerance and heat intolerance. Genitourinary:  Negative for difficulty urinating, hematuria and pelvic pain. Musculoskeletal:  Positive for back pain. Negative for gait problem and joint swelling. Skin:  Negative for rash and wound. Neurological:  Negative for dizziness, syncope and headaches. Hematological:  Negative for adenopathy. Psychiatric/Behavioral:  Negative for confusion, sleep disturbance and suicidal ideas.           Current Outpatient Medications:     Cholecalciferol (VITAMIN D3) 50 MCG (2000 UT) CAPS, Take by mouth 2 times daily, Disp: , Rfl:     levothyroxine (SYNTHROID) 125 MCG tablet, take 1 tablet by mouth once daily, Disp: , Rfl:     DULoxetine (CYMBALTA) 30 MG extended release capsule, Take 1 capsule by mouth daily, Disp: 30 capsule, Rfl: 2  Allergies   Allergen Reactions    [place] Thyroid [Thyroid] Myalgia and Hallucinations    Valium [Diazepam] Nausea Only      Past Medical History:   Diagnosis Date    GERD (gastroesophageal reflux disease)     Hyperlipidemia     no med    Hypertension     Hypothyroidism     Obesity     Sleep apnea     stopped using machine in 2020     Patient Active Problem List    Diagnosis Date Noted    Major depressive disorder, recurrent, moderate 09/29/2022    Major depressive disorder, recurrent, unspecified 09/29/2022    Pruritus 09/29/2022    Obstructive sleep apnea syndrome 09/29/2022    Mixed hyperlipidemia 09/29/2022    Acquired hypothyroidism 09/29/2022    Tubular adenoma of colon 07/22/2015    Recurrent ventral incisional hernia 05/11/2015    Family history of colon cancer 05/11/2015      Past Surgical History:   Procedure Laterality Date    ABDOMINAL HERNIA REPAIR      CHOLECYSTECTOMY      COLONOSCOPY      COLONOSCOPY  6/26/15    DR LUONG    HERNIA REPAIR  [id number]    Umbilical    THYROID SURGERY        Social History       Tobacco History       Smoking Status  Some Days Smoking Start Date  2002 Smoking Frequency  2.00 packs/day for 20.00 years (40.00 pk-yrs) Smoking Tobacco Type  Cigarettes since 2002      Smokeless Tobacco Use  Never              Alcohol History       Alcohol Use Status  Yes Drinks/Week  12 Shots of liquor per week Amount  12.0 standard drinks of alcohol/wk Comment  2-3 x week              Drug Use       Drug Use Status  No              Sexual Activity       Sexually Active  Yes                /86   Pulse 70   Temp 98.6 °F (37 °C)   Ht 5' 9\" (1.753 m)   Wt 204 lb (92.5 kg)   LMP 09/29/1992 (Approximate)   SpO2 97%   BMI 30.13 kg/m²     EXAM:   Physical Exam  Vitals and nursing note reviewed. Constitutional:       General: She is not in acute distress. Appearance: She is well-developed. She is not toxic-appearing. HENT:      Head: Normocephalic and atraumatic. Nose: No congestion. Eyes:      Pupils: Pupils are equal, round, and reactive to light. Cardiovascular:      Rate and Rhythm: Normal rate and regular rhythm. Pulmonary:      Effort: Pulmonary effort is normal.      Breath sounds: Wheezing present. Abdominal:      General: Bowel sounds are normal.      Palpations: Abdomen is soft. Musculoskeletal:      Cervical back: Normal range of motion. Skin:     General: Skin is warm and dry. Neurological:      Mental Status: She is alert and oriented to person, place, and time. Psychiatric:         Mood and Affect: Mood normal.         Thought Content: Thought content normal.        Nettie was seen today for pruritis and back pain. Diagnoses and all orders for this visit:    Acquired hypothyroidism  Comments:  cont thyroid dose of 125mcg  recheck labs in 3 months  Orders:  -     T4, Free; Future  -     TSH; Future  -     Comprehensive Metabolic Panel;  Future    Major depressive disorder, recurrent, moderate  Comments:  has gotten a little better with vit D and decrease in thyroid dose  she will start andrae cymbalta  may also be causing some of her pruritis  recheck 3 mos    Mixed hyperlipidemia  Comments:  likely hereditary  counseled about diet, mediterranean, she has a sweet tooth and likes her carbs  no keto    Tobacco use  Comments:  still smoking 2 ppd  recommend she really try to cut back    Prediabetes  Comments:  still drinking alcohol lmost nightly  recommend she decresas this  try to drink a lot less  Orders:  -     Comprehensive Metabolic Panel; Future      I independently reviewed and updated the chief complaint, HPI, past medical and surgical history, medications, allergies and ROS as entered by the LPN. Seen by:   Félix Zavala, DO

## 2022-11-09 ENCOUNTER — OFFICE VISIT (OUTPATIENT)
Dept: SLEEP MEDICINE | Age: 67
End: 2022-11-09
Payer: MEDICARE

## 2022-11-09 VITALS
HEART RATE: 75 BPM | RESPIRATION RATE: 12 BRPM | BODY MASS INDEX: 30.21 KG/M2 | WEIGHT: 204 LBS | DIASTOLIC BLOOD PRESSURE: 103 MMHG | HEIGHT: 69 IN | OXYGEN SATURATION: 99 % | SYSTOLIC BLOOD PRESSURE: 163 MMHG

## 2022-11-09 DIAGNOSIS — G47.33 OSA (OBSTRUCTIVE SLEEP APNEA): Primary | ICD-10-CM

## 2022-11-09 PROCEDURE — 99204 OFFICE O/P NEW MOD 45 MIN: CPT | Performed by: INTERNAL MEDICINE

## 2022-11-09 PROCEDURE — 1123F ACP DISCUSS/DSCN MKR DOCD: CPT | Performed by: INTERNAL MEDICINE

## 2022-11-09 ASSESSMENT — SLEEP AND FATIGUE QUESTIONNAIRES
HOW LIKELY ARE YOU TO NOD OFF OR FALL ASLEEP WHILE LYING DOWN TO REST IN THE AFTERNOON WHEN CIRCUMSTANCES PERMIT: 3
HOW LIKELY ARE YOU TO NOD OFF OR FALL ASLEEP WHEN YOU ARE A PASSENGER IN A CAR FOR AN HOUR WITHOUT A BREAK: 0
HOW LIKELY ARE YOU TO NOD OFF OR FALL ASLEEP IN A CAR, WHILE STOPPED FOR A FEW MINUTES IN TRAFFIC: 0
HOW LIKELY ARE YOU TO NOD OFF OR FALL ASLEEP WHILE SITTING INACTIVE IN A PUBLIC PLACE: 0
HOW LIKELY ARE YOU TO NOD OFF OR FALL ASLEEP WHILE WATCHING TV: 3
HOW LIKELY ARE YOU TO NOD OFF OR FALL ASLEEP WHILE SITTING AND READING: 3
ESS TOTAL SCORE: 12
HOW LIKELY ARE YOU TO NOD OFF OR FALL ASLEEP WHILE SITTING QUIETLY AFTER LUNCH WITHOUT ALCOHOL: 3
HOW LIKELY ARE YOU TO NOD OFF OR FALL ASLEEP WHILE SITTING AND TALKING TO SOMEONE: 0

## 2022-11-09 NOTE — PROGRESS NOTES
[facility] Sleep Medicine    Patient Name: Nichelle Huffman  Age: 74 y.o.   : 1955    Date of Visit: 22        HPI   Nichelle Huffman is a 74 y.o. female with  has a past medical history of GERD (gastroesophageal reflux disease), Hyperlipidemia, Hypertension, Hypothyroidism, Obesity, and Sleep apnea. who presents as a new patient to Sleep Clinic, referred by Dr. Daija Hay, for Sleep Apnea  . STOP-BANG:  Snore- yes   Tired- yes  Observed apneas/gasping/choking- no  High blood pressure- no  BMI > 35kg/sq m - no  Age > 50 - yes  Neck circumference > 40 cm - no  Gender male - no  Total score 3/8    Sleep Medicine 2022   Sitting and reading 3   Watching TV 3   Sitting, inactive in a public place (e.g. a theatre or a meeting) 0   As a passenger in a car for an hour without a break 0   Lying down to rest in the afternoon when circumstances permit 3   Sitting and talking to someone 0   Sitting quietly after a lunch without alcohol 3   In a car, while stopped for a few minutes in traffic 0   [place] Sleepiness Score 12      Has sleep study 7-8 years ago in [place]    She was started on CPAP at that time however she noticed sinus infections and bronchitis which were worsening  She stopped using the CPAP 2 years ago and the URIs have significantly reduced  She did clean her equipment every other day with soap/water and used distilled water. She noticed pink/slimy residue in her water chamber which she would clean out periodically.     Unclear what the severity of her NEEMA was  She does have very fragmented sleep and is very tired during the day  When she was on CPAP, her sleep was well consolidated, she rarely had arousals during the night, and it did make a significant difference in her sleep quality and daily symptoms          PMH:  Past Medical History:   Diagnosis Date    GERD (gastroesophageal reflux disease)     Hyperlipidemia     no med    Hypertension     Hypothyroidism     Obesity     Sleep apnea stopped using machine in 2020        PSH:  Past Surgical History:   Procedure Laterality Date    ABDOMINAL HERNIA REPAIR      CHOLECYSTECTOMY      COLONOSCOPY      COLONOSCOPY  6/26/15    DR LUONG    HERNIA REPAIR  [id number]    Umbilical    THYROID SURGERY            Soc Hx:  Social History     Tobacco Use    Smoking status: Some Days     Packs/day: 2.00     Years: 20.00     Pack years: 40.00     Types: Cigarettes     Start date: 2002    Smokeless tobacco: Never   Substance Use Topics    Alcohol use: Yes     Alcohol/week: 12.0 standard drinks     Types: 12 Shots of liquor per week     Comment: 2-3 x week    Drug use: No        Fam Hx:  Family History   Problem Relation Age of Onset    Colon Cancer Mother     Arthritis Mother     Cancer Mother     Diabetes Mother     Heart Disease Mother     High Blood Pressure Mother     High Cholesterol Mother     Kidney Disease Mother     Substance Abuse Mother     Arthritis Father     Heart Disease Father     High Blood Pressure Father     High Cholesterol Father     Stroke Father     Breast Cancer Sister     Arthritis Sister     Cancer Sister     Heart Disease Sister     High Cholesterol Sister     Stroke Sister     Heart Disease Brother     Breast Cancer Daughter         Current Outpatient Medications   Medication Sig Dispense Refill    levothyroxine (SYNTHROID) 125 MCG tablet take 1 tablet by mouth once daily      Cholecalciferol (VITAMIN D3) 50 MCG (2000 UT) CAPS Take by mouth 2 times daily      DULoxetine (CYMBALTA) 30 MG extended release capsule Take 1 capsule by mouth daily 30 capsule 2     No current facility-administered medications for this visit.         Review of Systems  (Sleep ROS above)  Review of Systems         Objective:   Physical Exam  BP (!) 163/103 (Site: Left Upper Arm, Position: Sitting, Cuff Size: Large Adult)   Pulse 75   Resp 12   Ht 5' 9\" (1.753 m)   Wt 204 lb (92.5 kg)   LMP 09/29/1992 (Approximate)   SpO2 99%   BMI 30.13 kg/m²        Physical Exam Sleep Medicine 11/9/2022   Sitting and reading 3   Watching TV 3   Sitting, inactive in a public place (e.g. a theatre or a meeting) 0   As a passenger in a car for an hour without a break 0   Lying down to rest in the afternoon when circumstances permit 3   Sitting and talking to someone 0   Sitting quietly after a lunch without alcohol 3   In a car, while stopped for a few minutes in traffic 0   [place] Sleepiness Score 12         SLEEP STUDY HISTORY      No specialty comments available. PERTINENT LAB RESULTS  No results found for: FERRITIN       Assessment:      Nettie was seen today for sleep apnea. Diagnoses and all orders for this visit:    NEEMA (obstructive sleep apnea)     Plan:      Known diagnosis of NEEMA but last sleep study was 8 years ago. We will get a repeat sleep study to reassess severity of NEEMA. Split study ordered. Patient would like to get sleep study at [facility] as it is closer to her home. We discussed various treatment options for NEEMA but would like to know severity of disease and extent of hypoxemia. Repeat study as above. We will meet after the sleep study to discuss results and further discuss treatment options. Patient will follow up Return in about 2 months (around 1/9/2023).     Darius Fraser, DO  Sleep Medicine   [facility] Phone -[phone], option 2  Fax- [phone]

## 2022-12-07 ENCOUNTER — TELEPHONE (OUTPATIENT)
Dept: SURGERY | Age: 67
End: 2022-12-07

## 2022-12-07 NOTE — TELEPHONE ENCOUNTER
The patient called. She would like to reschedule the appt for Friday. She is having Covid. Rescheduled her on 1/13/22 at 8:45am. The patient verbalized understanding.   Electronically signed by Pebbles Neil on 12/7/2022 at 3:27 PM

## 2023-01-16 DIAGNOSIS — E03.9 ACQUIRED HYPOTHYROIDISM: ICD-10-CM

## 2023-01-16 DIAGNOSIS — R73.03 PREDIABETES: ICD-10-CM

## 2023-01-16 LAB
ALBUMIN SERPL-MCNC: 4.6 G/DL (ref 3.5–5.2)
ALP BLD-CCNC: 76 U/L (ref 35–104)
ALT SERPL-CCNC: 47 U/L (ref 0–32)
ANION GAP SERPL CALCULATED.3IONS-SCNC: 20 MMOL/L (ref 7–16)
AST SERPL-CCNC: 37 U/L (ref 0–31)
BILIRUB SERPL-MCNC: 0.4 MG/DL (ref 0–1.2)
BUN BLDV-MCNC: 14 MG/DL (ref 6–23)
CALCIUM SERPL-MCNC: 9.5 MG/DL (ref 8.6–10.2)
CHLORIDE BLD-SCNC: 104 MMOL/L (ref 98–107)
CO2: 21 MMOL/L (ref 22–29)
CREAT SERPL-MCNC: 0.8 MG/DL (ref 0.5–1)
GFR SERPL CREATININE-BSD FRML MDRD: >60 ML/MIN/1.73
GLUCOSE BLD-MCNC: 111 MG/DL (ref 74–99)
POTASSIUM SERPL-SCNC: 4.6 MMOL/L (ref 3.5–5)
SODIUM BLD-SCNC: 145 MMOL/L (ref 132–146)
T4 FREE: 1.32 NG/DL (ref 0.93–1.7)
TOTAL PROTEIN: 7.4 G/DL (ref 6.4–8.3)
TSH SERPL DL<=0.05 MIU/L-ACNC: 0.14 UIU/ML (ref 0.27–4.2)

## 2023-01-23 ENCOUNTER — OFFICE VISIT (OUTPATIENT)
Dept: FAMILY MEDICINE CLINIC | Age: 68
End: 2023-01-23
Payer: MEDICARE

## 2023-01-23 VITALS
DIASTOLIC BLOOD PRESSURE: 88 MMHG | SYSTOLIC BLOOD PRESSURE: 138 MMHG | WEIGHT: 205 LBS | TEMPERATURE: 98.2 F | HEIGHT: 69 IN | HEART RATE: 84 BPM | BODY MASS INDEX: 30.36 KG/M2 | OXYGEN SATURATION: 98 %

## 2023-01-23 DIAGNOSIS — E78.2 MIXED HYPERLIPIDEMIA: ICD-10-CM

## 2023-01-23 DIAGNOSIS — R60.9 PERIPHERAL EDEMA: ICD-10-CM

## 2023-01-23 DIAGNOSIS — F33.1 MAJOR DEPRESSIVE DISORDER, RECURRENT, MODERATE (HCC): ICD-10-CM

## 2023-01-23 DIAGNOSIS — R73.03 PREDIABETES: ICD-10-CM

## 2023-01-23 DIAGNOSIS — R01.1 HEART MURMUR: ICD-10-CM

## 2023-01-23 DIAGNOSIS — I10 PRIMARY HYPERTENSION: Primary | ICD-10-CM

## 2023-01-23 DIAGNOSIS — E03.9 ACQUIRED HYPOTHYROIDISM: ICD-10-CM

## 2023-01-23 PROCEDURE — 3079F DIAST BP 80-89 MM HG: CPT | Performed by: FAMILY MEDICINE

## 2023-01-23 PROCEDURE — 1123F ACP DISCUSS/DSCN MKR DOCD: CPT | Performed by: FAMILY MEDICINE

## 2023-01-23 PROCEDURE — 99214 OFFICE O/P EST MOD 30 MIN: CPT | Performed by: FAMILY MEDICINE

## 2023-01-23 PROCEDURE — 3075F SYST BP GE 130 - 139MM HG: CPT | Performed by: FAMILY MEDICINE

## 2023-01-23 RX ORDER — LEVOTHYROXINE SODIUM 112 UG/1
TABLET ORAL
Qty: 90 TABLET | Refills: 0 | Status: SHIPPED | OUTPATIENT
Start: 2023-01-23

## 2023-01-23 RX ORDER — LOSARTAN POTASSIUM 25 MG/1
25 TABLET ORAL NIGHTLY
Qty: 90 TABLET | Refills: 0 | Status: SHIPPED | OUTPATIENT
Start: 2023-01-23

## 2023-01-23 ASSESSMENT — ENCOUNTER SYMPTOMS
VOMITING: 0
CHEST TIGHTNESS: 1
COUGH: 1
TROUBLE SWALLOWING: 0
EYE PAIN: 0
NAUSEA: 0
ABDOMINAL PAIN: 0
SHORTNESS OF BREATH: 0
BACK PAIN: 0
SINUS PAIN: 0
CONSTIPATION: 0
SORE THROAT: 0
DIARRHEA: 0
WHEEZING: 0

## 2023-01-23 NOTE — PROGRESS NOTES
23    Name: Maritza Gonzales  :1955   Sex:female   Age:67 y.o. Chief Complaint   Patient presents with    Depression    Hypothyroidism     Patient presents to office for visit. She did have labs done. Patient seeing [facility] for itchiness. She was prescribed ivermectin which she completed last Thursday. Patient did not use the Kenalog because it made her feel short of breath when she used the cream over her ribs. Patient has not started the cymbalta yet, she wants to wait until the itchiness is gone. Patient had covid after [facility]. She says she is still coughing stuff up and her chest feels tight often. Here for check up  Labs were done    HTN  Her bp is elevated again  We discussed this at length  She continues to smoke and drink on the weekends, she says she cut back during the week  Will start losartan 25mg at night  She should try to get a bp cuff for the arm  Recheck in a few months    Thyroid labs are still not adequate  We adjusted dose previously  Will decreased further to 112mcg  This can be causing some of her s/s  Will recheck labs in 3 months    Depression  Not taking cymbalta yet  Still recommend that she should take it    Prediabetes  Counseled about diet again  Her fasting blood sugar was enven higher this time  Recheck a1c in April  She may becoming diabetic now      Review of Systems   Constitutional:  Negative for appetite change, fatigue and fever. HENT:  Negative for congestion, ear pain, sinus pain, sore throat and trouble swallowing. Eyes:  Negative for pain. Respiratory:  Positive for cough and chest tightness. Negative for shortness of breath and wheezing. Cardiovascular:  Negative for chest pain, palpitations and leg swelling. Gastrointestinal:  Negative for abdominal pain, constipation, diarrhea, nausea and vomiting. Endocrine: Negative for cold intolerance and heat intolerance.    Genitourinary:  Negative for difficulty urinating, hematuria and pelvic pain. Musculoskeletal:  Negative for back pain, gait problem and joint swelling. Skin:  Negative for rash and wound. Neurological:  Negative for dizziness, syncope and headaches. Hematological:  Negative for adenopathy. Psychiatric/Behavioral:  Negative for confusion, sleep disturbance and suicidal ideas.           Current Outpatient Medications:     levothyroxine (SYNTHROID) 112 MCG tablet, take 1 tablet by mouth once daily, Disp: 90 tablet, Rfl: 0    losartan (COZAAR) 25 MG tablet, Take 1 tablet by mouth at bedtime, Disp: 90 tablet, Rfl: 0    Cholecalciferol (VITAMIN D3) 50 MCG (2000 UT) CAPS, Take by mouth 2 times daily, Disp: , Rfl:     DULoxetine (CYMBALTA) 30 MG extended release capsule, Take 1 capsule by mouth daily, Disp: 30 capsule, Rfl: 2  Allergies   Allergen Reactions    [place] Thyroid [Thyroid] Myalgia and Hallucinations    Valium [Diazepam] Nausea Only      Past Medical History:   Diagnosis Date    GERD (gastroesophageal reflux disease)     Hyperlipidemia     no med    Hypertension     Hypothyroidism     Obesity     Sleep apnea     stopped using machine in 2020     Patient Active Problem List    Diagnosis Date Noted    Major depressive disorder, recurrent, moderate 09/29/2022    Major depressive disorder, recurrent, unspecified 09/29/2022    Pruritus 09/29/2022    Obstructive sleep apnea syndrome 09/29/2022    Mixed hyperlipidemia 09/29/2022    Acquired hypothyroidism 09/29/2022    Tubular adenoma of colon 07/22/2015    Recurrent ventral incisional hernia 05/11/2015    Family history of colon cancer 05/11/2015      Past Surgical History:   Procedure Laterality Date    ABDOMINAL HERNIA REPAIR      CHOLECYSTECTOMY      COLONOSCOPY      COLONOSCOPY  6/26/15    DR Kellogg 10    HERNIA REPAIR  [id number]    Umbilical    THYROID SURGERY        Social History       Tobacco History       Smoking Status  Some Days Smoking Start Date  2002 Smoking Frequency  2.00 packs/day for 20.00 years (40.00 pk-yrs) Smoking Tobacco Type  Cigarettes since 2002      Smokeless Tobacco Use  Never              Alcohol History       Alcohol Use Status  Yes Drinks/Week  12 Shots of liquor per week Amount  12.0 standard drinks of alcohol/wk Comment  2-3 x week              Drug Use       Drug Use Status  No              Sexual Activity       Sexually Active  Yes                /88   Pulse 84   Temp 98.2 °F (36.8 °C)   Ht 5' 9\" (1.753 m)   Wt 205 lb (93 kg)   LMP 09/29/1992 (Approximate)   SpO2 98%   BMI 30.27 kg/m²     EXAM:   Physical Exam  Vitals and nursing note reviewed. Constitutional:       General: She is not in acute distress. Appearance: She is well-developed. She is not toxic-appearing. HENT:      Head: Normocephalic and atraumatic. Right Ear: Tympanic membrane normal.      Left Ear: Tympanic membrane normal.      Nose: Congestion present. Mouth/Throat:      Mouth: Mucous membranes are moist.      Pharynx: No oropharyngeal exudate. Eyes:      Pupils: Pupils are equal, round, and reactive to light. Cardiovascular:      Rate and Rhythm: Normal rate and regular rhythm. Heart sounds: Murmur heard. Comments: Systolic murmur  Pulmonary:      Effort: Pulmonary effort is normal.      Breath sounds: Normal breath sounds. Musculoskeletal:      Cervical back: Normal range of motion. Skin:     General: Skin is warm and dry. Neurological:      Mental Status: She is alert. Pedro Morton was seen today for depression and hypothyroidism. Diagnoses and all orders for this visit:    Primary hypertension  Comments:  start losrtan 25mg nightly  counseled on diet  recheck in a few months  Orders:  -     losartan (COZAAR) 25 MG tablet; Take 1 tablet by mouth at bedtime  -     CBC with Auto Differential; Future  -     Comprehensive Metabolic Panel; Future  -     Brain Natriuretic Peptide;  Future    Mixed hyperlipidemia  Comments:  counseled on diet  recheck in 3 months  Orders:  - Lipid Panel; Future    Prediabetes  Comments:  recheck a1c it was 5.8  FBS higher this last lab  counseled on diet  Orders:  -     losartan (COZAAR) 25 MG tablet; Take 1 tablet by mouth at bedtime  -     Hemoglobin A1C; Future    Acquired hypothyroidism  Comments:  TSH still too low  will change does again to 112mcg  take in am alone  recheck labs in 3 months  Orders:  -     levothyroxine (SYNTHROID) 112 MCG tablet; take 1 tablet by mouth once daily  -     T4, Free; Future  -     TSH; Future    Peripheral edema  Comments:  has a systolic murmur  possible heart failure  may need an echo  check BNP  Orders:  -     Brain Natriuretic Peptide; Future    Major depressive disorder, recurrent, moderate (HCC)  Comments:  declines cymbalta  she has not started it yet    Heart murmur  -     Brain Natriuretic Peptide; Future      I independently reviewed and updated the chief complaint, HPI, past medical and surgical history, medications, allergies and ROS as entered by the LPN. Seen by:   Jonelle Childress DO

## 2023-01-25 ENCOUNTER — TELEPHONE (OUTPATIENT)
Dept: SLEEP CENTER | Age: 68
End: 2023-01-25

## 2023-01-25 ENCOUNTER — TELEPHONE (OUTPATIENT)
Dept: ADMINISTRATIVE | Age: 68
End: 2023-01-25

## 2023-01-25 DIAGNOSIS — G47.33 OSA (OBSTRUCTIVE SLEEP APNEA): Primary | ICD-10-CM

## 2023-01-25 NOTE — TELEPHONE ENCOUNTER
Pt called to speak to the office as she cancelled her appt on 02/22/ She stated her Insurance denied her Sleep study due to notes for Lovenadinea Ano. She would like to discuss this. Please contact pt.

## 2023-02-20 ENCOUNTER — TELEPHONE (OUTPATIENT)
Dept: SLEEP MEDICINE | Age: 68
End: 2023-02-20

## 2023-02-20 NOTE — TELEPHONE ENCOUNTER
Called [facility] and was directed to [address] [place] [address] DrChikis Called Lisa and KELSEY asking for someone to please call to find out why pt had not been called to schedule HST yet.

## 2023-02-20 NOTE — TELEPHONE ENCOUNTER
Received call from pt stating she has not head from sleep lab to schedule her HST yet. She requested order be sent to [facility] sleep lab. Order was sent at end of January. Will call lab to see what delay is.

## 2023-02-24 ENCOUNTER — OFFICE VISIT (OUTPATIENT)
Dept: SURGERY | Age: 68
End: 2023-02-24
Payer: MEDICARE

## 2023-02-24 VITALS
SYSTOLIC BLOOD PRESSURE: 120 MMHG | BODY MASS INDEX: 30.81 KG/M2 | DIASTOLIC BLOOD PRESSURE: 78 MMHG | HEART RATE: 79 BPM | WEIGHT: 208 LBS | HEIGHT: 69 IN

## 2023-02-24 DIAGNOSIS — Z86.010 HISTORY OF COLON POLYPS: ICD-10-CM

## 2023-02-24 DIAGNOSIS — R10.11 RUQ PAIN: Primary | ICD-10-CM

## 2023-02-24 PROCEDURE — 1123F ACP DISCUSS/DSCN MKR DOCD: CPT | Performed by: SURGERY

## 2023-02-24 PROCEDURE — 99204 OFFICE O/P NEW MOD 45 MIN: CPT | Performed by: SURGERY

## 2023-02-24 NOTE — PROGRESS NOTES
[facility] Surgery Clinic Note    Assessment/Plan:      Diagnosis Orders   1. RUQ pain  US SOFT TISSUE LIMITED AREA    No obvious mass or abnormality on exam.  We will evaluate with ultrasound      2. History of colon polyps; family history of colon cancer      We will plan for colonoscopy. Return for Colonoscopy. Chief Complaint   Patient presents with    New Patient     Ref from dr Paola Dunbar for colon screening       PCP: Dutch Gutiérrez DO    HPI: Federico Sands is a 74 y.o. female who presents in consultation for history of colon polyps. Her last colonoscopy was in 2015. This was with Dr. Claudeen Martens. She had polyps removed at that time. She has no present diarrhea or constipation. There is no melena or hematochezia. There are no bowel caliber changes. She has no abnormal weight loss. Her mother and her half sister both passed from colon cancer. She is complains of epigastric pain. She has a history of a cholecystectomy and is at the site of her prior incision. She denies any mass or bulging at the location. She states this has been ongoing since her cholecystectomy. She reports to me that her operating surgeon said \"I already did surgery I do not want to operate on you again. \"      Past Medical History:   Diagnosis Date    GERD (gastroesophageal reflux disease)     Hyperlipidemia     no med    Hypertension     Hypothyroidism     Obesity     Sleep apnea     stopped using machine in 2020       Past Surgical History:   Procedure Laterality Date    ABDOMINAL HERNIA REPAIR      CHOLECYSTECTOMY      COLONOSCOPY      COLONOSCOPY  6/26/15    DR LUONG    HERNIA REPAIR  [id number]    Umbilical    THYROID SURGERY         Prior to Admission medications    Medication Sig Start Date End Date Taking?  Authorizing Provider   levothyroxine (SYNTHROID) 112 MCG tablet take 1 tablet by mouth once daily 1/23/23  Yes Dutch Gutiérrez DO   losartan (COZAAR) 25 MG tablet Take 1 tablet by mouth at bedtime 1/23/23  Yes Josi Travis,    Cholecalciferol (VITAMIN D3) 50 MCG (2000 UT) CAPS Take by mouth 2 times daily   Yes Historical Provider, MD   DULoxetine (CYMBALTA) 30 MG extended release capsule Take 1 capsule by mouth daily 9/29/22   Josi Travis DO       Allergies   Allergen Reactions    [place] Thyroid [Thyroid] Myalgia and Hallucinations    Valium [Diazepam] Nausea Only       Social History     Socioeconomic History    Marital status: Single   Tobacco Use    Smoking status: Some Days     Packs/day: 2.00     Years: 20.00     Pack years: 40.00     Types: Cigarettes     Start date: 2002    Smokeless tobacco: Never   Substance and Sexual Activity    Alcohol use: Yes     Alcohol/week: 12.0 standard drinks     Types: 12 Shots of liquor per week     Comment: 2-3 x week    Drug use: No    Sexual activity: Yes     Social Determinants of Health     Physical Activity: Sufficiently Active    Days of Exercise per Week: 5 days    Minutes of Exercise per Session: 40 min       Family History   Problem Relation Age of Onset    Colon Cancer Mother     Arthritis Mother     Cancer Mother     Diabetes Mother     Heart Disease Mother     High Blood Pressure Mother     High Cholesterol Mother     Kidney Disease Mother     Substance Abuse Mother     Arthritis Father     Heart Disease Father     High Blood Pressure Father     High Cholesterol Father     Stroke Father     Breast Cancer Sister     Arthritis Sister     Cancer Sister     Heart Disease Sister     High Cholesterol Sister     Stroke Sister     Heart Disease Brother     Breast Cancer Daughter        Review of Systems   All other systems reviewed and are negative. Objective:  Vitals:    02/24/23 0805 02/24/23 0808   BP: (!) 159/98 120/78   Site: Right Upper Arm Left Upper Arm   Pulse: 79    Weight: 208 lb (94.3 kg)    Height: 5' 9\" (1.753 m)           Physical Exam  HENT:      Head: Normocephalic and atraumatic.    Eyes:      General:         Right eye: No discharge. Left eye: No discharge. Neck:      Trachea: No tracheal deviation. Cardiovascular:      Rate and Rhythm: Normal rate. Pulmonary:      Effort: Pulmonary effort is normal. No respiratory distress. Abdominal:      General: There is no distension. Palpations: Abdomen is soft. Tenderness: There is no abdominal tenderness. There is no guarding or rebound. Skin:     General: Skin is warm and dry. Neurological:      Mental Status: She is alert and oriented to person, place, and time. Jordy Chu MD    I have examined the patient and performed the key aspects of physical exam, reviewed the record (including all pertinent and new radiology images and laboratory findings, referring provider notes and results), and discussed the case with the primary and referring provider where applicable. NOTE: This report, in part or full,may have been transcribed using voice recognition software. Every effort was made to ensure accuracy; however, inadvertent computerized transcription errors may be present. Please excuse any transcriptional grammatical or spelling errors that may have escaped my editorial review.     CC: Mechelle Anand, DO

## 2023-03-01 ENCOUNTER — TELEPHONE (OUTPATIENT)
Dept: SURGERY | Age: 68
End: 2023-03-01

## 2023-03-01 ENCOUNTER — PREP FOR PROCEDURE (OUTPATIENT)
Dept: SURGERY | Age: 68
End: 2023-03-01

## 2023-03-01 PROBLEM — R10.11 RUQ ABDOMINAL PAIN: Status: ACTIVE | Noted: 2023-03-01

## 2023-03-01 PROBLEM — Z86.0100 HX OF COLONIC POLYPS: Status: ACTIVE | Noted: 2023-03-01

## 2023-03-01 PROBLEM — Z86.010 HX OF COLONIC POLYPS: Status: ACTIVE | Noted: 2023-03-01

## 2023-03-01 NOTE — TELEPHONE ENCOUNTER
Ryan Laureano is scheduled for colonoscopy with Dr Tom Ba on 06-15-23 at [facility] at 10:45 am. Patient was told to arrive at 9:45 am. Patient needs to be NPO after midnight the night before procedure. All surgery instructions were explained to the patient and a surgery letter was also mailed out. MA informed patient that PAT will also be calling to review pre-op instructions and medications. Patient verbalized understanding. Patient questioned if the liver cysts which the ultrasound showed would interfere with the colonoscopy being done. MA checked with Dr Tom Ba who stated it would not interfere. MA informed patient.   Electronically signed by Eliana Ordoñez MA on 3/1/2023 at 12:57 PM

## 2023-03-17 ENCOUNTER — TELEPHONE (OUTPATIENT)
Dept: SLEEP MEDICINE | Age: 68
End: 2023-03-17

## 2023-03-17 NOTE — TELEPHONE ENCOUNTER
Called pt to let her know that I do not have results from her SS yet. Dr Cordelia Kincaid is the interpreting physician. I did talk to Agustin Moon in his office. She stated that they do not have her SS yet from Dr Cordelia Kincaid. She will contact the lab to get information to Dr. Angelica Alvarado pt aware of this. I will call her once results are received.   Pt understood

## 2023-04-18 ENCOUNTER — TELEPHONE (OUTPATIENT)
Dept: SLEEP MEDICINE | Age: 68
End: 2023-04-18

## 2023-04-18 NOTE — TELEPHONE ENCOUNTER
SS results were received on 4/17. Called pt and let her know that results were just received from Dr Ata García office who jusr received them from [address] [place] [address] sleep lab. Ss shows mild NEEMA. Pt was on CPAP in past but kept getting URI's so she stopped use. Phone appt was made with Dr Chrissy Mendez to go over results and options for treatment  She thinks getting a new CPAP may resolve her past issues.   She does want to discuss this with the Dr. Darian Juarez that  is ot of office this week and phone visit was made for 4/25

## 2023-04-25 ENCOUNTER — OFFICE VISIT (OUTPATIENT)
Dept: FAMILY MEDICINE CLINIC | Age: 68
End: 2023-04-25

## 2023-04-25 VITALS
OXYGEN SATURATION: 98 % | HEART RATE: 78 BPM | TEMPERATURE: 97.9 F | HEIGHT: 69 IN | SYSTOLIC BLOOD PRESSURE: 150 MMHG | DIASTOLIC BLOOD PRESSURE: 96 MMHG | WEIGHT: 208.6 LBS | BODY MASS INDEX: 30.9 KG/M2

## 2023-04-25 DIAGNOSIS — E03.9 ACQUIRED HYPOTHYROIDISM: ICD-10-CM

## 2023-04-25 DIAGNOSIS — M25.561 ARTHRALGIA OF BOTH KNEES: ICD-10-CM

## 2023-04-25 DIAGNOSIS — M25.562 ARTHRALGIA OF BOTH KNEES: ICD-10-CM

## 2023-04-25 DIAGNOSIS — I10 PRIMARY HYPERTENSION: Primary | ICD-10-CM

## 2023-04-25 DIAGNOSIS — Z72.0 TOBACCO USE: ICD-10-CM

## 2023-04-25 DIAGNOSIS — R73.03 PREDIABETES: ICD-10-CM

## 2023-04-25 RX ORDER — LEVOTHYROXINE SODIUM 112 UG/1
TABLET ORAL
Qty: 90 TABLET | Refills: 1 | Status: SHIPPED | OUTPATIENT
Start: 2023-04-25

## 2023-04-25 RX ORDER — LOSARTAN POTASSIUM 100 MG/1
100 TABLET ORAL NIGHTLY
Qty: 90 TABLET | Refills: 0 | Status: SHIPPED | OUTPATIENT
Start: 2023-04-25

## 2023-04-25 SDOH — ECONOMIC STABILITY: HOUSING INSECURITY
IN THE LAST 12 MONTHS, WAS THERE A TIME WHEN YOU DID NOT HAVE A STEADY PLACE TO SLEEP OR SLEPT IN A SHELTER (INCLUDING NOW)?: NO

## 2023-04-25 SDOH — ECONOMIC STABILITY: TRANSPORTATION INSECURITY
IN THE PAST 12 MONTHS, HAS LACK OF TRANSPORTATION KEPT YOU FROM MEETINGS, WORK, OR FROM GETTING THINGS NEEDED FOR DAILY LIVING?: NO

## 2023-04-25 SDOH — ECONOMIC STABILITY: INCOME INSECURITY: HOW HARD IS IT FOR YOU TO PAY FOR THE VERY BASICS LIKE FOOD, HOUSING, MEDICAL CARE, AND HEATING?: PATIENT DECLINED

## 2023-04-25 SDOH — ECONOMIC STABILITY: FOOD INSECURITY: WITHIN THE PAST 12 MONTHS, YOU WORRIED THAT YOUR FOOD WOULD RUN OUT BEFORE YOU GOT MONEY TO BUY MORE.: PATIENT DECLINED

## 2023-04-25 SDOH — ECONOMIC STABILITY: FOOD INSECURITY: WITHIN THE PAST 12 MONTHS, THE FOOD YOU BOUGHT JUST DIDN'T LAST AND YOU DIDN'T HAVE MONEY TO GET MORE.: PATIENT DECLINED

## 2023-04-25 ASSESSMENT — ENCOUNTER SYMPTOMS
TROUBLE SWALLOWING: 0
SHORTNESS OF BREATH: 1
BACK PAIN: 0
EYE PAIN: 0
ABDOMINAL PAIN: 0
WHEEZING: 0
SORE THROAT: 0
CHEST TIGHTNESS: 0
DIARRHEA: 0
CONSTIPATION: 0
SINUS PAIN: 0
COUGH: 0
VOMITING: 0
NAUSEA: 0

## 2023-04-25 ASSESSMENT — PATIENT HEALTH QUESTIONNAIRE - PHQ9
SUM OF ALL RESPONSES TO PHQ9 QUESTIONS 1 & 2: 0
SUM OF ALL RESPONSES TO PHQ QUESTIONS 1-9: 0
2. FEELING DOWN, DEPRESSED OR HOPELESS: NOT AT ALL
SUM OF ALL RESPONSES TO PHQ QUESTIONS 1-9: 0
2. FEELING DOWN, DEPRESSED OR HOPELESS: 0
SUM OF ALL RESPONSES TO PHQ QUESTIONS 1-9: 0
1. LITTLE INTEREST OR PLEASURE IN DOING THINGS: NOT AT ALL
1. LITTLE INTEREST OR PLEASURE IN DOING THINGS: 0
SUM OF ALL RESPONSES TO PHQ9 QUESTIONS 1 & 2: 0
SUM OF ALL RESPONSES TO PHQ QUESTIONS 1-9: 0

## 2023-04-25 NOTE — PROGRESS NOTES
23    Name: Ayse Sheridan  :1955   Sex:female   Age:67 y.o. Chief Complaint   Patient presents with    Hypertension    Hypothyroidism     Patient presents to office for visit. She did have labs done. Patient says her blood pressure was 140's over 80's the last time she took it. She continues to have joint pain and is concerned the Losartan is causing it. Patient says the pain has lessened some since starting the losartan, but she still has trouble with her knees, ankles, and feet bothering her. Patient stopped all of the supplements she was taking because she thought they could be causing her issues, however, her symptoms persist.     Here for check up  HTN  Still running high  We discussed diet, low salt, healthier foods  Also cut back on tobacco  Also  need to adjust losartan  Increase to 50mg daily x 1 week then 100mg daily    Joint pain  Still there in hips and knees but not as bad as she was taking the vitamin d  Recommend glucosamine  Also more exercise, yoga or thi chi this would really help her joint pain and flexibility'    Thyroid levels much better today  She is feeling much better  The jitters are better but still itching    Anxiety  Still some stress, this is liekly what is causing the itching  No rash present  She never tryied the cymbalta  Trying to stay hydrated  Cautioned about switching soaps often etc    Tobacco use  Counseled about quitting again  She is trying but no meds wanted      Review of Systems   Constitutional:  Negative for appetite change, fatigue and fever. HENT:  Negative for congestion, ear pain, sinus pain, sore throat and trouble swallowing. Eyes:  Negative for pain. Respiratory:  Positive for shortness of breath. Negative for cough, chest tightness and wheezing. Cardiovascular:  Negative for chest pain, palpitations and leg swelling. Gastrointestinal:  Negative for abdominal pain, constipation, diarrhea, nausea and vomiting.    Endocrine: Negative for

## 2023-04-28 ENCOUNTER — TELEPHONE (OUTPATIENT)
Dept: SURGERY | Age: 68
End: 2023-04-28

## 2023-04-28 NOTE — TELEPHONE ENCOUNTER
Prior Authorization Form:      DEMOGRAPHICS:                     Patient Name:  Haresh Padgett  Patient :  1955            Insurance:  Payor: Serenavon Tristian / Plan: [address] [place] [address] HMO / Product Type: Medicare /   Insurance ID Number:    Payer/Plan Subscr  Sex Relation Sub. Ins. ID Effective Group Num   1.  Davis Lubin* Darrius Dick 1955 Female Self [id number] [id number]         DIAGNOSIS & PROCEDURE:                       Procedure/Operation: Colonoscopy           CPT Code: 12698    Diagnosis:  History of colon polyps/family history of colon cancer    ICD10 Code: Z86.010/Z80.0    Location:  [facility]    Surgeon:  Dr Ileana Neves INFORMATION:                          Date: 23    Time: 9:00 am              Anesthesia:  [facility]                                                       Status:  Outpatient        Special Comments:  RS from 06-15-23       Electronically signed by Yury Maurer MA on 2023 at 3:41 PM

## 2023-04-28 NOTE — TELEPHONE ENCOUNTER
Patient's colonoscopy is rescheduled to 09-07-23 at [facility]. MA will mail out surgery and follow up letters.   Electronically signed by Alise Zhou MA on 4/28/2023 at 3:40 PM

## 2023-05-12 RX ORDER — DILTIAZEM HYDROCHLORIDE 120 MG/1
120 CAPSULE, COATED, EXTENDED RELEASE ORAL DAILY
Qty: 30 CAPSULE | Refills: 1 | Status: SHIPPED | OUTPATIENT
Start: 2023-05-12

## 2023-05-23 ENCOUNTER — SCHEDULED TELEPHONE ENCOUNTER (OUTPATIENT)
Dept: SLEEP CENTER | Age: 68
End: 2023-05-23
Payer: MEDICARE

## 2023-05-23 DIAGNOSIS — G47.33 OSA (OBSTRUCTIVE SLEEP APNEA): Primary | ICD-10-CM

## 2023-05-23 PROCEDURE — 99443 PR PHYS/QHP TELEPHONE EVALUATION 21-30 MIN: CPT | Performed by: INTERNAL MEDICINE

## 2023-07-11 RX ORDER — DILTIAZEM HYDROCHLORIDE 120 MG/1
120 CAPSULE, COATED, EXTENDED RELEASE ORAL DAILY
Qty: 10 CAPSULE | Refills: 0 | Status: SHIPPED | OUTPATIENT
Start: 2023-07-11 | End: 2023-07-21

## 2023-07-11 NOTE — TELEPHONE ENCOUNTER
Patient is out of Diltiazem. She has an appointment on 7/20 and thinks dose or medication may change at the appt. She is requesting #10 be sent to [facility] in [place] to hold her over until she is seen.      Last Appointment:  4/25/2023  Future Appointments   Date Time Provider [address]   7/20/2023  2:15 PM Bonnell Canavan [place], [place] Zahida ARVIZU [facility]   9/22/2023 10:15 AM Aida Ribera MD COL SURG [facility]    '

## 2023-07-20 ENCOUNTER — OFFICE VISIT (OUTPATIENT)
Dept: FAMILY MEDICINE CLINIC | Age: 68
End: 2023-07-20
Payer: MEDICARE

## 2023-07-20 VITALS
OXYGEN SATURATION: 97 % | WEIGHT: 205 LBS | TEMPERATURE: 98.2 F | BODY MASS INDEX: 30.36 KG/M2 | SYSTOLIC BLOOD PRESSURE: 152 MMHG | HEIGHT: 69 IN | DIASTOLIC BLOOD PRESSURE: 92 MMHG | HEART RATE: 72 BPM

## 2023-07-20 DIAGNOSIS — E06.3 HASHIMOTO'S THYROIDITIS: Primary | ICD-10-CM

## 2023-07-20 DIAGNOSIS — I10 PRIMARY HYPERTENSION: ICD-10-CM

## 2023-07-20 DIAGNOSIS — L29.9 PRURITUS: ICD-10-CM

## 2023-07-20 DIAGNOSIS — E78.2 MIXED HYPERLIPIDEMIA: ICD-10-CM

## 2023-07-20 DIAGNOSIS — R73.03 PREDIABETES: ICD-10-CM

## 2023-07-20 DIAGNOSIS — Z72.0 TOBACCO USE: ICD-10-CM

## 2023-07-20 PROCEDURE — 1123F ACP DISCUSS/DSCN MKR DOCD: CPT | Performed by: FAMILY MEDICINE

## 2023-07-20 PROCEDURE — 99214 OFFICE O/P EST MOD 30 MIN: CPT | Performed by: FAMILY MEDICINE

## 2023-07-20 PROCEDURE — 3077F SYST BP >= 140 MM HG: CPT | Performed by: FAMILY MEDICINE

## 2023-07-20 PROCEDURE — 3080F DIAST BP >= 90 MM HG: CPT | Performed by: FAMILY MEDICINE

## 2023-07-20 RX ORDER — LOSARTAN POTASSIUM 100 MG/1
100 TABLET ORAL DAILY
Qty: 90 TABLET | Refills: 0 | Status: SHIPPED | OUTPATIENT
Start: 2023-07-20

## 2023-07-20 RX ORDER — ALBUTEROL SULFATE 90 UG/1
2 AEROSOL, METERED RESPIRATORY (INHALATION) 4 TIMES DAILY PRN
Qty: 18 G | Refills: 0 | Status: SHIPPED | OUTPATIENT
Start: 2023-07-20

## 2023-07-20 ASSESSMENT — ENCOUNTER SYMPTOMS
BACK PAIN: 0
SORE THROAT: 0
NAUSEA: 0
SINUS PAIN: 0
DIARRHEA: 0
CONSTIPATION: 0
ABDOMINAL PAIN: 0
SHORTNESS OF BREATH: 1
VOMITING: 0
CHEST TIGHTNESS: 0
TROUBLE SWALLOWING: 0
COUGH: 0
EYE PAIN: 0
WHEEZING: 0

## 2023-07-20 NOTE — PROGRESS NOTES
Constitutional:  Negative for appetite change, fatigue and fever. HENT:  Negative for congestion, ear pain, sinus pain, sore throat and trouble swallowing. Eyes:  Negative for pain. Respiratory:  Positive for shortness of breath. Negative for cough, chest tightness and wheezing. Cardiovascular:  Positive for leg swelling. Negative for chest pain and palpitations. Gastrointestinal:  Negative for abdominal pain, constipation, diarrhea, nausea and vomiting. Endocrine: Negative for cold intolerance and heat intolerance. Genitourinary:  Positive for decreased urine volume. Negative for difficulty urinating, hematuria and pelvic pain. Musculoskeletal:  Negative for arthralgias, back pain, gait problem, joint swelling and myalgias. Skin:  Negative for rash and wound. Neurological:  Negative for dizziness, syncope and headaches. Hematological:  Negative for adenopathy. Psychiatric/Behavioral:  Negative for confusion, dysphoric mood, self-injury, sleep disturbance and suicidal ideas. The patient is not nervous/anxious.           Current Outpatient Medications:     losartan (COZAAR) 100 MG tablet, Take 1 tablet by mouth daily, Disp: 90 tablet, Rfl: 0    albuterol sulfate HFA (VENTOLIN HFA) 108 (90 Base) MCG/ACT inhaler, Inhale 2 puffs into the lungs 4 times daily as needed for Wheezing, Disp: 18 g, Rfl: 0    levothyroxine (SYNTHROID) 112 MCG tablet, take 1 tablet by mouth once daily, Disp: 90 tablet, Rfl: 1  Allergies   Allergen Reactions    [place] Thyroid [Thyroid] Myalgia and Hallucinations    Valium [Diazepam] Nausea Only      Past Medical History:   Diagnosis Date    GERD (gastroesophageal reflux disease)     Hyperlipidemia     no med    Hypertension     Hypothyroidism     Obesity     Sleep apnea     stopped using machine in 2020     Patient Active Problem List    Diagnosis Date Noted    Major depressive disorder, recurrent, moderate 09/29/2022    Major depressive disorder, recurrent,

## 2023-07-25 RX ORDER — METOPROLOL SUCCINATE 25 MG/1
25 TABLET, EXTENDED RELEASE ORAL 2 TIMES DAILY
Qty: 60 TABLET | Refills: 1 | Status: SHIPPED | OUTPATIENT
Start: 2023-07-25

## 2023-08-21 ENCOUNTER — TELEPHONE (OUTPATIENT)
Dept: FAMILY MEDICINE CLINIC | Age: 68
End: 2023-08-21

## 2023-08-21 DIAGNOSIS — R19.5 ABNORMAL FECES: Primary | ICD-10-CM

## 2023-08-21 NOTE — TELEPHONE ENCOUNTER
Patient has a concern she would like to discuss with you. She wouldn't go into detail or make an appointment. She would like for you to call her.

## 2023-08-21 NOTE — TELEPHONE ENCOUNTER
Patient had itching and gurgling stomach all last week. Patient thought she had a stomach bug. Patient says when she moved her bowels on Friday she is certain that white worms came to the surface. This is very unsettling for patient. 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 Patient says she is so worried she is half tempted to drive to [facility], but she wants to go through PCP. Patient asks what she can do for the itching. Patient asks if any labs can be done to determine if she has a parasite. She says she still notices white worm like things in her stool, but they are less. She appointment for colonoscopy in beginning of September and feels mortified to go through with that if this parasite is not taken care of prior.

## 2023-08-23 DIAGNOSIS — R19.5 ABNORMAL FECES: ICD-10-CM

## 2023-08-26 LAB
CULTURE: NORMAL
CULTURE: NORMAL
SPECIMEN DESCRIPTION: NORMAL

## 2023-08-30 RX ORDER — FLUTICASONE PROPIONATE 50 MCG
1 SPRAY, SUSPENSION (ML) NASAL DAILY
COMMUNITY

## 2023-08-30 NOTE — PROGRESS NOTES
[address] PRE-ADMISSION TESTING INSTRUCTIONS      ARRIVAL INSTRUCTIONS:  [x] Parking the day of Surgery is located in the Main Entrance lot. Upon entering the main door make an immediate right to the surgery reception desk. [x] Bring photo ID and insurance card    [] Bring in a copy of Living will or Durable Power of  papers. [x] Please be sure to arrange for responsible adult to provide transportation to and from the hospital    [x] Please arrange for responsible adult to be with you for the 24 hour period post procedure due to having anesthesia    [x] If you awake am of surgery not feeling well or have temperature >100 please call [phone]    GENERAL INSTRUCTIONS:    [x] Nothing by mouth after midnight, including gum, candy, mints or water    [x] You may brush your teeth, but do not swallow any water    [x] Take medications as instructed with 1-2 oz of water    [x] Stop herbal supplements and vitamins 5 days prior to procedure    [x] Follow preop dosing of blood thinners per physician instructions    [x] Take 1/2 dose of evening insulin, but no insulin after midnight    [x] No oral diabetic medications after midnight    [] If diabetic and have low blood sugar or feel symptomatic, take 1-2oz apple juice only    [] Bring inhalers day of surgery    [] Bring C-PAP/ Bi-Pap day of surgery    [] Bring urine specimen day of surgery    [x] Shower or bath with soap, lather and rinse well, AM of Surgery, no lotion, powders or creams to surgical site    [x] Follow bowel prep as instructed per surgeon    [x] No tobacco products within 24 hours of surgery     [x] No alcohol or illegal drug use within 24 hours of surgery.     [x] Jewelry, body piercing's, eyeglasses, contact lenses and dentures are not permitted into surgery (bring cases)      [x] Please do not wear any nail polish, make up or hair products on the day of surgery    [x] You can expect a call the business day prior to

## 2023-09-06 ENCOUNTER — ANESTHESIA EVENT (OUTPATIENT)
Dept: ENDOSCOPY | Age: 68
End: 2023-09-06
Payer: MEDICARE

## 2023-09-07 ENCOUNTER — HOSPITAL ENCOUNTER (OUTPATIENT)
Age: 68
Setting detail: OUTPATIENT SURGERY
Discharge: HOME | End: 2023-09-07
Attending: SURGERY | Admitting: SURGERY
Payer: MEDICARE

## 2023-09-07 ENCOUNTER — ANESTHESIA (OUTPATIENT)
Dept: ENDOSCOPY | Age: 68
End: 2023-09-07
Payer: MEDICARE

## 2023-09-07 VITALS
HEART RATE: 70 BPM | HEIGHT: 69 IN | SYSTOLIC BLOOD PRESSURE: 185 MMHG | TEMPERATURE: 97.2 F | WEIGHT: 192 LBS | BODY MASS INDEX: 28.44 KG/M2 | OXYGEN SATURATION: 94 % | DIASTOLIC BLOOD PRESSURE: 93 MMHG | RESPIRATION RATE: 16 BRPM

## 2023-09-07 DIAGNOSIS — Z86.010 HX OF COLONIC POLYPS: ICD-10-CM

## 2023-09-07 DIAGNOSIS — R10.11 RUQ ABDOMINAL PAIN: ICD-10-CM

## 2023-09-07 DIAGNOSIS — Z80.0 FH: COLON CANCER: ICD-10-CM

## 2023-09-07 PROCEDURE — 7100000011 HC PHASE II RECOVERY - ADDTL 15 MIN: Performed by: SURGERY

## 2023-09-07 PROCEDURE — 3700000000 HC ANESTHESIA ATTENDED CARE: Performed by: SURGERY

## 2023-09-07 PROCEDURE — 6360000002 HC RX W HCPCS: Performed by: NURSE ANESTHETIST, CERTIFIED REGISTERED

## 2023-09-07 PROCEDURE — 88305 TISSUE EXAM BY PATHOLOGIST: CPT

## 2023-09-07 PROCEDURE — 2709999900 HC NON-CHARGEABLE SUPPLY: Performed by: SURGERY

## 2023-09-07 PROCEDURE — 3700000001 HC ADD 15 MINUTES (ANESTHESIA): Performed by: SURGERY

## 2023-09-07 PROCEDURE — 2580000003 HC RX 258

## 2023-09-07 PROCEDURE — 3609010600 HC COLONOSCOPY POLYPECTOMY SNARE/COLD BIOPSY: Performed by: SURGERY

## 2023-09-07 PROCEDURE — 7100000010 HC PHASE II RECOVERY - FIRST 15 MIN: Performed by: SURGERY

## 2023-09-07 RX ORDER — PROPOFOL 10 MG/ML
INJECTION, EMULSION INTRAVENOUS PRN
Status: DISCONTINUED | OUTPATIENT
Start: 2023-09-07 | End: 2023-09-07 | Stop reason: SDUPTHER

## 2023-09-07 RX ORDER — ESMOLOL HYDROCHLORIDE 10 MG/ML
INJECTION INTRAVENOUS PRN
Status: DISCONTINUED | OUTPATIENT
Start: 2023-09-07 | End: 2023-09-07 | Stop reason: SDUPTHER

## 2023-09-07 RX ORDER — SODIUM CHLORIDE 9 MG/ML
INJECTION, SOLUTION INTRAVENOUS CONTINUOUS PRN
Status: DISCONTINUED | OUTPATIENT
Start: 2023-09-07 | End: 2023-09-07 | Stop reason: SDUPTHER

## 2023-09-07 RX ADMIN — PROPOFOL 20 MG: 10 INJECTION, EMULSION INTRAVENOUS at 09:31

## 2023-09-07 RX ADMIN — PROPOFOL 40 MG: 10 INJECTION, EMULSION INTRAVENOUS at 09:38

## 2023-09-07 RX ADMIN — PROPOFOL 50 MG: 10 INJECTION, EMULSION INTRAVENOUS at 09:46

## 2023-09-07 RX ADMIN — PROPOFOL 40 MG: 10 INJECTION, EMULSION INTRAVENOUS at 09:34

## 2023-09-07 RX ADMIN — ESMOLOL HYDROCHLORIDE 30 MG: 10 INJECTION, SOLUTION INTRAVENOUS at 09:46

## 2023-09-07 RX ADMIN — PROPOFOL 50 MG: 10 INJECTION, EMULSION INTRAVENOUS at 09:49

## 2023-09-07 RX ADMIN — PROPOFOL 40 MG: 10 INJECTION, EMULSION INTRAVENOUS at 09:29

## 2023-09-07 RX ADMIN — PROPOFOL 140 MG: 10 INJECTION, EMULSION INTRAVENOUS at 09:26

## 2023-09-07 RX ADMIN — PROPOFOL 30 MG: 10 INJECTION, EMULSION INTRAVENOUS at 09:36

## 2023-09-07 RX ADMIN — PROPOFOL 40 MG: 10 INJECTION, EMULSION INTRAVENOUS at 09:42

## 2023-09-07 RX ADMIN — SODIUM CHLORIDE: 9 INJECTION, SOLUTION INTRAVENOUS at 09:07

## 2023-09-07 ASSESSMENT — PAIN DESCRIPTION - FREQUENCY: FREQUENCY: INTERMITTENT

## 2023-09-07 ASSESSMENT — PAIN - FUNCTIONAL ASSESSMENT
PAIN_FUNCTIONAL_ASSESSMENT: ACTIVITIES ARE NOT PREVENTED
PAIN_FUNCTIONAL_ASSESSMENT: ACTIVITIES ARE NOT PREVENTED

## 2023-09-07 ASSESSMENT — PAIN DESCRIPTION - PAIN TYPE
TYPE: SURGICAL PAIN
TYPE: SURGICAL PAIN

## 2023-09-07 ASSESSMENT — PAIN SCALES - GENERAL
PAINLEVEL_OUTOF10: 3
PAINLEVEL_OUTOF10: 0
PAINLEVEL_OUTOF10: 3

## 2023-09-07 ASSESSMENT — PAIN DESCRIPTION - ORIENTATION
ORIENTATION: ANTERIOR
ORIENTATION: ANTERIOR

## 2023-09-07 ASSESSMENT — PAIN DESCRIPTION - DESCRIPTORS: DESCRIPTORS: DISCOMFORT

## 2023-09-07 ASSESSMENT — PAIN DESCRIPTION - LOCATION
LOCATION: ABDOMEN

## 2023-09-07 ASSESSMENT — LIFESTYLE VARIABLES: SMOKING_STATUS: 1

## 2023-09-07 NOTE — H&P
[facility] Surgery Clinic Note     Assessment/Plan:        Diagnosis Orders   1. RUQ pain  US SOFT TISSUE LIMITED AREA     No obvious mass or abnormality on exam.  We will evaluate with ultrasound       2. History of colon polyps; family history of colon cancer        We will plan for colonoscopy. Return for Colonoscopy. Chief Complaint   Patient presents with    New Patient       Ref from dr Daniel Tobias for colon screening         PCP: Cristobal Han DO     HPI: Dileep Meraz is a 74 y.o. female who presents in consultation for history of colon polyps. Her last colonoscopy was in 2015. This was with Dr. Loreto Nicholson. She had polyps removed at that time. She has no present diarrhea or constipation. There is no melena or hematochezia. There are no bowel caliber changes. She has no abnormal weight loss. Her mother and her half sister both passed from colon cancer. She is complains of epigastric pain. She has a history of a cholecystectomy and is at the site of her prior incision. She denies any mass or bulging at the location. She states this has been ongoing since her cholecystectomy. She reports to me that her operating surgeon said \"I already did surgery I do not want to operate on you again. \"        Past Medical History        Past Medical History:   Diagnosis Date    GERD (gastroesophageal reflux disease)      Hyperlipidemia       no med    Hypertension      Hypothyroidism      Obesity      Sleep apnea       stopped using machine in 2020            Past Surgical History         Past Surgical History:   Procedure Laterality Date    ABDOMINAL HERNIA REPAIR        CHOLECYSTECTOMY        COLONOSCOPY        COLONOSCOPY   6/26/15     DR LUONG    HERNIA REPAIR   1429     Umbilical    THYROID SURGERY                Home Medications           Prior to Admission medications    Medication Sig Start Date End Date Taking?  Authorizing Provider   levothyroxine (SYNTHROID) 112 MCG tablet 02/24/23 0805 02/24/23 0808   BP: (!) 159/98 120/78   Site: Right Upper Arm Left Upper Arm   Pulse: 79     Weight: 208 lb (94.3 kg)     Height: 5' 9\" (1.753 m)              Physical Exam  HENT:      Head: Normocephalic and atraumatic. Eyes:      General:         Right eye: No discharge. Left eye: No discharge. Neck:      Trachea: No tracheal deviation. Cardiovascular:      Rate and Rhythm: Normal rate. Pulmonary:      Effort: Pulmonary effort is normal. No respiratory distress. Abdominal:      General: There is no distension. Palpations: Abdomen is soft. Tenderness: There is no abdominal tenderness. There is no guarding or rebound. Skin:     General: Skin is warm and dry. Neurological:      Mental Status: She is alert and oriented to person, place, and time. Isamar Purdy MD     I have examined the patient and performed the key aspects of physical exam, reviewed the record (including all pertinent and new radiology images and laboratory findings, referring provider notes and results), and discussed the case with the primary and referring provider where applicable. NOTE: This report, in part or full,may have been transcribed using voice recognition software. Every effort was made to ensure accuracy; however, inadvertent computerized transcription errors may be present. Please excuse any transcriptional grammatical or spelling errors that may have escaped my editorial review.      CC: Tonny Howard DO

## 2023-09-07 NOTE — PROGRESS NOTES
1020 up to bathroom and is passing gas  1030 Dr Deisy Roberts here and spoke to pt and her family member and then discharge instructions reviewed with pt and her family memberand they verbalized understanding of instructions

## 2023-09-07 NOTE — OP NOTE
Colonoscopy Op Note  PATIENT: Lena Aviles    DATE OF PROCEDURE: 9/7/2023    SURGEON: Aria Shepherd MD    PREOPERATIVE DIAGNOSIS:  History of colon polyp, family history of colon cancer    POSTOPERATIVE DIAGNOSIS: Same, colon polyps, diverticulosis, hemorrhoids    OPERATION: Procedure(s):  COLONOSCOPY WITH BIOPSY  COLONOSCOPY POLYPECTOMY HOT BIOPSY  COLONOSCOPY POLYPECTOMY SNARE/COLD BIOPSY    ANESTHESIA: Local monitored anesthesia. ESTIMATED BLOOD LOSS: nil     COMPLICATIONS: None. SPECIMENS:   ID Type Source Tests Collected by Time Destination   A : cold snare recto-sigmoid polyp Tissue Colon SURGICAL PATHOLOGY Aria Shepherd MD 9/7/2023 0930    B : hot snare sigmoid polyp Tissue Colon SURGICAL PATHOLOGY Aria Shepherd MD 9/7/2023 9504    C : hot snare hepatic flexure polyp Tissue Colon SURGICAL PATHOLOGY Aria Shepherd MD 9/7/2023 3197    D : bx polyp  #2 hepatic flexure Tissue Colon SURGICAL PATHOLOGY Aria Sehpherd MD 9/7/2023 0940    E : bx transverse colon polyp Tissue Colon SURGICAL PATHOLOGY Aria Shepherd MD 9/7/2023 3661    F : cold snare polyp#2 sigmoid colon Tissue Colon SURGICAL PATHOLOGY Aria Shepherd MD 9/7/2023 7692          HISTORY: The patient is a 79y.o. year old female with history of above preop diagnosis. I recommended colonoscopy with possible biopsy or polypectomy and I explained the risk, benefits, expected outcome, and alternatives to the procedure. Risks included but are not limited to bleeding, infection, respiratory distress, hypotension, and perforation of the colon. The patient understands and is in agreement. PROCEDURE: The patient was given IV conscious sedation per anesthesia. The patient was given supplemental oxygen by nasal cannula. The colonoscope was inserted per rectum and advanced under direct vision to the cecum without difficulty, identified by appendiceal orifice and ileocecal valve. The prep was good so exam was adequate.     FINDINGS:    JD:

## 2023-09-07 NOTE — PROGRESS NOTES
1045 has been up t bathroom a few times and is passing gas but still has intermittent gas discomfort

## 2023-09-07 NOTE — PROGRESS NOTES
1125pt feeling much better  is passing gas better and DR Suri Mabry updated  and its ok for pt to be discharged   1135 pt discharged into the care of her daughter

## 2023-09-08 ENCOUNTER — TELEPHONE (OUTPATIENT)
Dept: SLEEP MEDICINE | Age: 68
End: 2023-09-08

## 2023-09-08 NOTE — TELEPHONE ENCOUNTER
Pt called in and stated she is having issues with her mask fitting correctly. She called michelle GL[facility]OW and they told her to call the office to see about a setting change. Pt thinks the pressure is too high and that is why the mask is not fitting properly. [facility] will not change the pressure without an order.  Will notify Dr of pt req to decrease pressure

## 2023-09-08 NOTE — ANESTHESIA POSTPROCEDURE EVALUATION
Department of Anesthesiology  Postprocedure Note    Patient: Deya Gutierrez  MRN: [id number]  [address] 1955  Date of evaluation: 9/8/2023      Procedure Summary     Date: 09/07/23 Room / Location: [place] [address] / [facility]    Anesthesia Start: 7051 Anesthesia Stop: 2680    Procedure: COLONOSCOPY POLYPECTOMY SNARE/COLD BIOPSY Diagnosis:       FH: colon cancer      Hx of colonic polyps      RUQ abdominal pain      (FH: colon cancer [Z80.0])      (Hx of colonic polyps [Z86.010])      (RUQ abdominal pain [R10.11])    Surgeons: Herrera Ricci MD Responsible Provider: Kings Agarwal MD    Anesthesia Type: MAC ASA Status: 3          Anesthesia Type: No value filed.     Zina Phase I:      Zina Phase II: Zina Score: 10      Anesthesia Post Evaluation    Patient location during evaluation: PACU  Patient participation: complete - patient participated  Level of consciousness: awake  Airway patency: patent  Nausea & Vomiting: no nausea and no vomiting  Complications: no  Cardiovascular status: hemodynamically stable  Respiratory status: acceptable  Hydration status: stable  Pain management: adequate

## 2023-09-11 DIAGNOSIS — G47.33 OSA (OBSTRUCTIVE SLEEP APNEA): Primary | ICD-10-CM

## 2023-09-11 LAB — SURGICAL PATHOLOGY REPORT: NORMAL

## 2023-09-18 ENCOUNTER — TELEPHONE (OUTPATIENT)
Dept: SLEEP MEDICINE | Age: 68
End: 2023-09-18

## 2023-09-18 NOTE — TELEPHONE ENCOUNTER
Talked with pt and let her kow that Dr Mary Anne Wilkes placed an order for Mask fitting and did adjust her pressure slightly. Order was sent to [facility].   Advised pt to call if she continues to have issues with her mask

## 2023-09-20 RX ORDER — METOPROLOL SUCCINATE 25 MG/1
TABLET, EXTENDED RELEASE ORAL
Qty: 60 TABLET | Refills: 0 | Status: SHIPPED | OUTPATIENT
Start: 2023-09-20

## 2023-09-22 ENCOUNTER — OFFICE VISIT (OUTPATIENT)
Dept: SURGERY | Age: 68
End: 2023-09-22
Payer: MEDICARE

## 2023-09-22 VITALS
OXYGEN SATURATION: 94 % | RESPIRATION RATE: 18 BRPM | HEART RATE: 55 BPM | DIASTOLIC BLOOD PRESSURE: 70 MMHG | WEIGHT: 200 LBS | HEIGHT: 68 IN | TEMPERATURE: 96.8 F | BODY MASS INDEX: 30.31 KG/M2 | SYSTOLIC BLOOD PRESSURE: 120 MMHG

## 2023-09-22 DIAGNOSIS — K63.5 COLORECTAL POLYP DETECTED ON COLONOSCOPY: Primary | ICD-10-CM

## 2023-09-22 DIAGNOSIS — K57.30 DIVERTICULOSIS OF LARGE INTESTINE WITHOUT HEMORRHAGE: ICD-10-CM

## 2023-09-22 DIAGNOSIS — R19.5 LOOSE STOOLS: ICD-10-CM

## 2023-09-22 PROCEDURE — 1123F ACP DISCUSS/DSCN MKR DOCD: CPT | Performed by: SURGERY

## 2023-09-22 PROCEDURE — 99214 OFFICE O/P EST MOD 30 MIN: CPT | Performed by: SURGERY

## 2023-09-22 NOTE — PROGRESS NOTES
[facility] Surgery Clinic Note    Assessment/Plan:     Diagnosis Orders   1. Colorectal polyps detected on colonoscopy       repeat colonoscopy in 3 years      2. Diverticulosis of large intestine without hemorrhage      Fiber diet      3. Loose stools      Probable medication induced. Recommended OTC fiber supplementation. Return if symptoms worsen or fail to improve. Chief Complaint   Patient presents with    Results     Follow up for colonoscopy        PCP: Thomas Leroy DO    HPI: Lena Aviles is a 79 y.o. female here for follow-up of colonoscopy for family history of colon cancer and personal history of polyps. Her colonoscopy showed polyps that were removed. Pathology showed tubular adenomas. She also had diverticulosis and hemorrhoids. She has no abdominal pain. She denies any blood in the stools. Ever since she started metoprolol she notes loose stools. She has not tried anything for this. Regarding her chronic right upper quadrant pain, the ultrasound report shows no abnormality, two small liver cysts that are reported. Review of Systems   All other systems reviewed and are negative.         Past Medical History:   Diagnosis Date    GERD (gastroesophageal reflux disease)     Hyperlipidemia     no med    Hypertension     Hypothyroidism     Obesity     Sleep apnea     uses cpap nightly       Past Surgical History:   Procedure Laterality Date    ABDOMINAL HERNIA REPAIR      CHOLECYSTECTOMY      COLONOSCOPY      COLONOSCOPY  06/26/2015    DR LUONG    COLONOSCOPY N/A 9/7/2023    COLONOSCOPY POLYPECTOMY SNARE/COLD BIOPSY performed by Aria Shepherd MD at [facility]  55/33/2976    Umbilical    THYROID SURGERY         Family History   Problem Relation Age of Onset    Colon Cancer Mother     Arthritis Mother     Cancer Mother     Diabetes Mother     Heart Disease Mother     High Blood Pressure Mother     High Cholesterol Mother     Kidney Disease Mother

## 2023-10-06 DIAGNOSIS — E78.2 MIXED HYPERLIPIDEMIA: ICD-10-CM

## 2023-10-06 DIAGNOSIS — R73.03 PREDIABETES: ICD-10-CM

## 2023-10-06 DIAGNOSIS — E06.3 HASHIMOTO'S THYROIDITIS: ICD-10-CM

## 2023-10-06 LAB
ABSOLUTE IMMATURE GRANULOCYTE: <0.03 K/UL (ref 0–0.58)
ALBUMIN SERPL-MCNC: 4.3 G/DL (ref 3.5–5.2)
ALP BLD-CCNC: 73 U/L (ref 35–104)
ALT SERPL-CCNC: 26 U/L (ref 0–32)
ANION GAP SERPL CALCULATED.3IONS-SCNC: 13 MMOL/L (ref 7–16)
AST SERPL-CCNC: 24 U/L (ref 0–31)
BASOPHILS ABSOLUTE: 0.03 K/UL (ref 0–0.2)
BASOPHILS RELATIVE PERCENT: 1 % (ref 0–2)
BILIRUB SERPL-MCNC: 0.4 MG/DL (ref 0–1.2)
BUN BLDV-MCNC: 12 MG/DL (ref 6–23)
CALCIUM SERPL-MCNC: 9.1 MG/DL (ref 8.6–10.2)
CHLORIDE BLD-SCNC: 107 MMOL/L (ref 98–107)
CHOLESTEROL: 223 MG/DL
CO2: 22 MMOL/L (ref 22–29)
CORTISOL COLLECTION INFO: NORMAL
CORTISOL: 17.7 UG/DL (ref 2.7–18.4)
CREAT SERPL-MCNC: 0.9 MG/DL (ref 0.5–1)
CREATININE URINE: 86.3 MG/DL (ref 29–226)
EOSINOPHILS ABSOLUTE: 0.1 K/UL (ref 0.05–0.5)
EOSINOPHILS RELATIVE PERCENT: 2 % (ref 0–6)
GFR SERPL CREATININE-BSD FRML MDRD: >60 ML/MIN/1.73M2
GLUCOSE BLD-MCNC: 107 MG/DL (ref 74–99)
HBA1C MFR BLD: 5.3 % (ref 4–5.6)
HCT VFR BLD CALC: 43.9 % (ref 34–48)
HDLC SERPL-MCNC: 45 MG/DL
HEMOGLOBIN: 14 G/DL (ref 11.5–15.5)
IMMATURE GRANULOCYTES: 0 % (ref 0–5)
LDL CHOLESTEROL: 138 MG/DL
LYMPHOCYTES ABSOLUTE: 1.3 K/UL (ref 1.5–4)
LYMPHOCYTES RELATIVE PERCENT: 26 % (ref 20–42)
MCH RBC QN AUTO: 31.4 PG (ref 26–35)
MCHC RBC AUTO-ENTMCNC: 31.9 G/DL (ref 32–34.5)
MCV RBC AUTO: 98.4 FL (ref 80–99.9)
MICROALBUMIN/CREAT 24H UR: <12 MG/L (ref 0–19)
MICROALBUMIN/CREAT UR-RTO: NORMAL MCG/MG CREAT (ref 0–30)
MONOCYTES ABSOLUTE: 0.47 K/UL (ref 0.1–0.95)
MONOCYTES RELATIVE PERCENT: 9 % (ref 2–12)
NEUTROPHILS ABSOLUTE: 3.15 K/UL (ref 1.8–7.3)
NEUTROPHILS RELATIVE PERCENT: 62 % (ref 43–80)
PDW BLD-RTO: 12.5 % (ref 11.5–15)
PLATELET, FLUORESCENCE: 122 K/UL (ref 130–450)
PMV BLD AUTO: 12.6 FL (ref 7–12)
POTASSIUM SERPL-SCNC: 4.7 MMOL/L (ref 3.5–5)
PTH INTACT: 44.2 PG/ML (ref 15–65)
RBC # BLD: 4.46 M/UL (ref 3.5–5.5)
SODIUM BLD-SCNC: 142 MMOL/L (ref 132–146)
T4 FREE: 1.2 NG/DL (ref 0.9–1.7)
TOTAL PROTEIN: 6.9 G/DL (ref 6.4–8.3)
TRIGL SERPL-MCNC: 198 MG/DL
TSH SERPL DL<=0.05 MIU/L-ACNC: 0.62 UIU/ML (ref 0.27–4.2)
VLDLC SERPL CALC-MCNC: 40 MG/DL
WBC # BLD: 5.1 K/UL (ref 4.5–11.5)

## 2023-10-11 LAB — THYROID PEROXIDASE (TPO) AB: 32 IU/ML (ref 0–25)

## 2023-10-12 ENCOUNTER — OFFICE VISIT (OUTPATIENT)
Dept: FAMILY MEDICINE CLINIC | Age: 68
End: 2023-10-12
Payer: MEDICARE

## 2023-10-12 VITALS
HEART RATE: 92 BPM | SYSTOLIC BLOOD PRESSURE: 138 MMHG | WEIGHT: 202.6 LBS | HEIGHT: 69 IN | BODY MASS INDEX: 30.01 KG/M2 | DIASTOLIC BLOOD PRESSURE: 88 MMHG | TEMPERATURE: 98.2 F | OXYGEN SATURATION: 99 %

## 2023-10-12 VITALS
BODY MASS INDEX: 30.01 KG/M2 | HEIGHT: 69 IN | HEART RATE: 92 BPM | SYSTOLIC BLOOD PRESSURE: 138 MMHG | TEMPERATURE: 98.2 F | WEIGHT: 202.6 LBS | DIASTOLIC BLOOD PRESSURE: 92 MMHG | OXYGEN SATURATION: 99 %

## 2023-10-12 DIAGNOSIS — E03.9 ACQUIRED HYPOTHYROIDISM: ICD-10-CM

## 2023-10-12 DIAGNOSIS — Z72.0 TOBACCO USE: ICD-10-CM

## 2023-10-12 DIAGNOSIS — I10 PRIMARY HYPERTENSION: Primary | ICD-10-CM

## 2023-10-12 DIAGNOSIS — E78.2 MIXED HYPERLIPIDEMIA: ICD-10-CM

## 2023-10-12 DIAGNOSIS — Z00.00 INITIAL MEDICARE ANNUAL WELLNESS VISIT: Primary | ICD-10-CM

## 2023-10-12 DIAGNOSIS — Z12.31 ENCOUNTER FOR SCREENING MAMMOGRAM FOR BREAST CANCER: ICD-10-CM

## 2023-10-12 PROCEDURE — 3079F DIAST BP 80-89 MM HG: CPT | Performed by: FAMILY MEDICINE

## 2023-10-12 PROCEDURE — 3080F DIAST BP >= 90 MM HG: CPT | Performed by: FAMILY MEDICINE

## 2023-10-12 PROCEDURE — 1123F ACP DISCUSS/DSCN MKR DOCD: CPT | Performed by: FAMILY MEDICINE

## 2023-10-12 PROCEDURE — 3075F SYST BP GE 130 - 139MM HG: CPT | Performed by: FAMILY MEDICINE

## 2023-10-12 PROCEDURE — G0438 PPPS, INITIAL VISIT: HCPCS | Performed by: FAMILY MEDICINE

## 2023-10-12 PROCEDURE — 99214 OFFICE O/P EST MOD 30 MIN: CPT | Performed by: FAMILY MEDICINE

## 2023-10-12 RX ORDER — LEVOTHYROXINE SODIUM 112 UG/1
TABLET ORAL
Qty: 90 TABLET | Refills: 1 | Status: SHIPPED | OUTPATIENT
Start: 2023-10-12

## 2023-10-12 RX ORDER — HYDROCHLOROTHIAZIDE 25 MG/1
25 TABLET ORAL EVERY MORNING
Qty: 30 TABLET | Refills: 5 | Status: SHIPPED | OUTPATIENT
Start: 2023-10-12

## 2023-10-12 RX ORDER — METOPROLOL SUCCINATE 25 MG/1
TABLET, EXTENDED RELEASE ORAL
Qty: 180 TABLET | Refills: 0 | Status: CANCELLED | OUTPATIENT
Start: 2023-10-12

## 2023-10-12 ASSESSMENT — PATIENT HEALTH QUESTIONNAIRE - PHQ9
2. FEELING DOWN, DEPRESSED OR HOPELESS: 0
SUM OF ALL RESPONSES TO PHQ9 QUESTIONS 1 & 2: 0
8. MOVING OR SPEAKING SO SLOWLY THAT OTHER PEOPLE COULD HAVE NOTICED. OR THE OPPOSITE, BEING SO FIGETY OR RESTLESS THAT YOU HAVE BEEN MOVING AROUND A LOT MORE THAN USUAL: 0
10. IF YOU CHECKED OFF ANY PROBLEMS, HOW DIFFICULT HAVE THESE PROBLEMS MADE IT FOR YOU TO DO YOUR WORK, TAKE CARE OF THINGS AT HOME, OR GET ALONG WITH OTHER PEOPLE: 0
7. TROUBLE CONCENTRATING ON THINGS, SUCH AS READING THE NEWSPAPER OR WATCHING TELEVISION: 0
SUM OF ALL RESPONSES TO PHQ QUESTIONS 1-9: 0
1. LITTLE INTEREST OR PLEASURE IN DOING THINGS: 0
SUM OF ALL RESPONSES TO PHQ QUESTIONS 1-9: 0
SUM OF ALL RESPONSES TO PHQ QUESTIONS 1-9: 0
5. POOR APPETITE OR OVEREATING: 0
9. THOUGHTS THAT YOU WOULD BE BETTER OFF DEAD, OR OF HURTING YOURSELF: 0
SUM OF ALL RESPONSES TO PHQ QUESTIONS 1-9: 0
4. FEELING TIRED OR HAVING LITTLE ENERGY: 0
6. FEELING BAD ABOUT YOURSELF - OR THAT YOU ARE A FAILURE OR HAVE LET YOURSELF OR YOUR FAMILY DOWN: 0
3. TROUBLE FALLING OR STAYING ASLEEP: 0

## 2023-10-12 ASSESSMENT — LIFESTYLE VARIABLES
HOW OFTEN DO YOU HAVE A DRINK CONTAINING ALCOHOL: 2-3 TIMES A WEEK
HOW MANY STANDARD DRINKS CONTAINING ALCOHOL DO YOU HAVE ON A TYPICAL DAY: 1 OR 2

## 2023-10-12 ASSESSMENT — ENCOUNTER SYMPTOMS
CHEST TIGHTNESS: 0
COUGH: 0
EYE PAIN: 0
SORE THROAT: 0
CONSTIPATION: 0
SINUS PAIN: 0
NAUSEA: 0
SHORTNESS OF BREATH: 1
DIARRHEA: 0
ABDOMINAL PAIN: 0
VOMITING: 0
WHEEZING: 0
BACK PAIN: 0
TROUBLE SWALLOWING: 0

## 2023-10-30 ENCOUNTER — OFFICE VISIT (OUTPATIENT)
Dept: SLEEP MEDICINE | Age: 68
End: 2023-10-30
Payer: MEDICARE

## 2023-10-30 VITALS
OXYGEN SATURATION: 98 % | SYSTOLIC BLOOD PRESSURE: 139 MMHG | WEIGHT: 200.62 LBS | HEIGHT: 68 IN | BODY MASS INDEX: 30.41 KG/M2 | DIASTOLIC BLOOD PRESSURE: 89 MMHG | HEART RATE: 90 BPM | RESPIRATION RATE: 17 BRPM

## 2023-10-30 DIAGNOSIS — G47.10 HYPERSOMNOLENCE: ICD-10-CM

## 2023-10-30 DIAGNOSIS — G47.33 OSA (OBSTRUCTIVE SLEEP APNEA): Primary | ICD-10-CM

## 2023-10-30 PROCEDURE — 3074F SYST BP LT 130 MM HG: CPT | Performed by: INTERNAL MEDICINE

## 2023-10-30 PROCEDURE — 3078F DIAST BP <80 MM HG: CPT | Performed by: INTERNAL MEDICINE

## 2023-10-30 PROCEDURE — 1123F ACP DISCUSS/DSCN MKR DOCD: CPT | Performed by: INTERNAL MEDICINE

## 2023-10-30 PROCEDURE — 99214 OFFICE O/P EST MOD 30 MIN: CPT | Performed by: INTERNAL MEDICINE

## 2023-10-30 ASSESSMENT — SLEEP AND FATIGUE QUESTIONNAIRES
HOW LIKELY ARE YOU TO NOD OFF OR FALL ASLEEP WHILE SITTING AND READING: 3
HOW LIKELY ARE YOU TO NOD OFF OR FALL ASLEEP WHILE SITTING INACTIVE IN A PUBLIC PLACE: 1
HOW LIKELY ARE YOU TO NOD OFF OR FALL ASLEEP WHILE LYING DOWN TO REST IN THE AFTERNOON WHEN CIRCUMSTANCES PERMIT: 2
ESS TOTAL SCORE: 12
HOW LIKELY ARE YOU TO NOD OFF OR FALL ASLEEP WHILE SITTING AND TALKING TO SOMEONE: 0
HOW LIKELY ARE YOU TO NOD OFF OR FALL ASLEEP WHILE WATCHING TV: 3
HOW LIKELY ARE YOU TO NOD OFF OR FALL ASLEEP WHEN YOU ARE A PASSENGER IN A CAR FOR AN HOUR WITHOUT A BREAK: 0
HOW LIKELY ARE YOU TO NOD OFF OR FALL ASLEEP WHILE SITTING QUIETLY AFTER LUNCH WITHOUT ALCOHOL: 3
HOW LIKELY ARE YOU TO NOD OFF OR FALL ASLEEP IN A CAR, WHILE STOPPED FOR A FEW MINUTES IN TRAFFIC: 0

## 2023-10-30 NOTE — PROGRESS NOTES
[address] [place] [address],[address] Floor Sleep Medicine    Patient Name: Rama Royal  Age: 79 y.o.   : 1955    Date of Visit: 10/31/2023         HPI   Rama Royal is a 79 y.o. female with  has a past medical history of GERD (gastroesophageal reflux disease), Hyperlipidemia, Hypertension, Hypothyroidism, Obesity, and Sleep apnea. Using ffm, leaking on sides of face  Working with DME, has tried several different masks so far  Has not had any issues with sinus infections with the new CPAP. Cleaning daily with soap/water. Feeling much less tired since restarted CPAP        Prev hpi  STOP-BANG:  Snore- yes   Tired- yes  Observed apneas/gasping/choking- no  High blood pressure- no  BMI > 35kg/sq m - no  Age > 50 - yes  Neck circumference > 40 cm - no  Gender male - no  Total score 3/8        10/30/2023     2:23 PM 2022    10:21 AM   Sleep Medicine   Sitting and reading 3 3   Watching TV 3 3   Sitting, inactive in a public place (e.g. a theatre or a meeting) 1 0   As a passenger in a car for an hour without a break 0 0   Lying down to rest in the afternoon when circumstances permit 2 3   Sitting and talking to someone 0 0   Sitting quietly after a lunch without alcohol 3 3   In a car, while stopped for a few minutes in traffic 0 0   [place] Sleepiness Score 12 12      Has sleep study 7-8 years ago in [place]    She was started on CPAP at that time however she noticed sinus infections and bronchitis which were worsening  She stopped using the CPAP 2 years ago and the URIs have significantly reduced  She did clean her equipment every other day with soap/water and used distilled water. She noticed pink/slimy residue in her water chamber which she would clean out periodically.     Unclear what the severity of her NEEMA was  She does have very fragmented sleep and is very tired during the day  When she was on CPAP, her sleep was well consolidated, she rarely had arousals during the night, and it did make a significant difference in

## 2024-01-08 SDOH — HEALTH STABILITY: PHYSICAL HEALTH: ON AVERAGE, HOW MANY DAYS PER WEEK DO YOU ENGAGE IN MODERATE TO STRENUOUS EXERCISE (LIKE A BRISK WALK)?: 6 DAYS

## 2024-01-08 SDOH — HEALTH STABILITY: PHYSICAL HEALTH: ON AVERAGE, HOW MANY MINUTES DO YOU ENGAGE IN EXERCISE AT THIS LEVEL?: 140 MIN

## 2024-01-08 ASSESSMENT — PATIENT HEALTH QUESTIONNAIRE - PHQ9
2. FEELING DOWN, DEPRESSED OR HOPELESS: 0
1. LITTLE INTEREST OR PLEASURE IN DOING THINGS: 2
SUM OF ALL RESPONSES TO PHQ QUESTIONS 1-9: 2
SUM OF ALL RESPONSES TO PHQ9 QUESTIONS 1 & 2: 2

## 2024-01-08 ASSESSMENT — LIFESTYLE VARIABLES
HOW OFTEN DO YOU HAVE SIX OR MORE DRINKS ON ONE OCCASION: 1
HOW OFTEN DO YOU HAVE A DRINK CONTAINING ALCOHOL: 2-4 TIMES A MONTH
HOW MANY STANDARD DRINKS CONTAINING ALCOHOL DO YOU HAVE ON A TYPICAL DAY: 1
HOW MANY STANDARD DRINKS CONTAINING ALCOHOL DO YOU HAVE ON A TYPICAL DAY: 1 OR 2
HOW OFTEN DO YOU HAVE A DRINK CONTAINING ALCOHOL: 3

## 2024-01-09 ENCOUNTER — OFFICE VISIT (OUTPATIENT)
Dept: FAMILY MEDICINE CLINIC | Age: 69
End: 2024-01-09
Payer: MEDICARE

## 2024-01-09 VITALS
OXYGEN SATURATION: 96 % | BODY MASS INDEX: 30.16 KG/M2 | HEIGHT: 69 IN | WEIGHT: 203.6 LBS | HEART RATE: 82 BPM | SYSTOLIC BLOOD PRESSURE: 122 MMHG | DIASTOLIC BLOOD PRESSURE: 80 MMHG | TEMPERATURE: 98.2 F

## 2024-01-09 VITALS
OXYGEN SATURATION: 96 % | HEIGHT: 69 IN | TEMPERATURE: 98.2 F | DIASTOLIC BLOOD PRESSURE: 80 MMHG | HEART RATE: 82 BPM | SYSTOLIC BLOOD PRESSURE: 122 MMHG | BODY MASS INDEX: 30.16 KG/M2 | WEIGHT: 203.6 LBS

## 2024-01-09 DIAGNOSIS — I10 PRIMARY HYPERTENSION: Primary | ICD-10-CM

## 2024-01-09 DIAGNOSIS — G47.33 OBSTRUCTIVE SLEEP APNEA SYNDROME: ICD-10-CM

## 2024-01-09 DIAGNOSIS — Z00.00 MEDICARE ANNUAL WELLNESS VISIT, SUBSEQUENT: Primary | ICD-10-CM

## 2024-01-09 DIAGNOSIS — E03.9 ACQUIRED HYPOTHYROIDISM: ICD-10-CM

## 2024-01-09 DIAGNOSIS — Z72.0 TOBACCO USE: ICD-10-CM

## 2024-01-09 DIAGNOSIS — E78.2 MIXED HYPERLIPIDEMIA: ICD-10-CM

## 2024-01-09 PROBLEM — F33.9 MAJOR DEPRESSIVE DISORDER, RECURRENT, UNSPECIFIED (HCC): Status: RESOLVED | Noted: 2022-09-29 | Resolved: 2024-01-09

## 2024-01-09 PROBLEM — F33.1 MAJOR DEPRESSIVE DISORDER, RECURRENT, MODERATE (HCC): Status: RESOLVED | Noted: 2022-09-29 | Resolved: 2024-01-09

## 2024-01-09 PROCEDURE — 3074F SYST BP LT 130 MM HG: CPT | Performed by: FAMILY MEDICINE

## 2024-01-09 PROCEDURE — G0439 PPPS, SUBSEQ VISIT: HCPCS | Performed by: FAMILY MEDICINE

## 2024-01-09 PROCEDURE — 99214 OFFICE O/P EST MOD 30 MIN: CPT | Performed by: FAMILY MEDICINE

## 2024-01-09 PROCEDURE — 3079F DIAST BP 80-89 MM HG: CPT | Performed by: FAMILY MEDICINE

## 2024-01-09 PROCEDURE — 1123F ACP DISCUSS/DSCN MKR DOCD: CPT | Performed by: FAMILY MEDICINE

## 2024-01-09 RX ORDER — HYDROCHLOROTHIAZIDE 25 MG/1
25 TABLET ORAL EVERY MORNING
Qty: 90 TABLET | Refills: 0 | Status: SHIPPED | OUTPATIENT
Start: 2024-01-09

## 2024-01-09 ASSESSMENT — ENCOUNTER SYMPTOMS
CHEST TIGHTNESS: 0
COUGH: 0
CONSTIPATION: 0
ABDOMINAL PAIN: 0
NAUSEA: 0
BACK PAIN: 0
DIARRHEA: 0
EYE PAIN: 0
SINUS PAIN: 0
SHORTNESS OF BREATH: 0
VOMITING: 0
SORE THROAT: 0
WHEEZING: 0
TROUBLE SWALLOWING: 0

## 2024-01-09 NOTE — PROGRESS NOTES
24    Name: Nettie Sequeira  :1955   Sex:female   Age:68 y.o.    Chief Complaint   Patient presents with    Hypertension     Patient presents to office for recheck of HTN. She is taking HCTZ without any issues. Patient says her blood pressure readings at home are similar to today's in the office. She denies any complaints.     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   HENT:  Negative for congestion, ear pain, sinus pain, sore throat and trouble swallowing.    Eyes:  Negative for pain.   Respiratory:  Negative for cough, chest tightness, shortness of breath and wheezing.    Cardiovascular:  Negative for chest pain, palpitations and leg swelling.   Gastrointestinal:  Negative for abdominal pain, constipation, diarrhea, nausea and vomiting.   Endocrine: Negative for cold intolerance and heat intolerance.   Genitourinary:  Negative for difficulty urinating, hematuria and pelvic pain.   Musculoskeletal:  Negative

## 2024-01-09 NOTE — PROGRESS NOTES
Medicare Annual Wellness Visit    Nettie Sequeira is here for Medicare AWV    Assessment & Plan   Medicare annual wellness visit, subsequent    Recommendations for Preventive Services Due: see orders and patient instructions/AVS.  Recommended screening schedule for the next 5-10 years is provided to the patient in written form: see Patient Instructions/AVS.     Return for Medicare Annual Wellness Visit in 1 year.     Subjective   The following acute and/or chronic problems were also addressed today:  Screenings, vaccines, advance care plans, tobacco cessation    Patient's complete Health Risk Assessment and screening values have been reviewed and are found in Flowsheets. The following problems were reviewed today and where indicated follow up appointments were made and/or referrals ordered.    Positive Risk Factor Screenings with Interventions:                Activity, Diet, and Weight:               Body mass index is 30.07 kg/m². (!) Abnormal    Do you eat balanced/healthy meals regularly Interventions:  She is working on steps, trying to eat better, doesn ot qualify for weight loss medications due to blood pressures issues  Obesity Interventions:  Eating better, getting more steps, but doesn ot qualify for diet medications             Hearing Screen:       Interventions:  Patient declines any further evaluation or treatment    Vision Screen:        No results found.    Interventions:   Patient encouraged to make appointment with their eye specialist      Advanced Directives:       Intervention:  has NO advanced directive - information provided        Tobacco Use:  Tobacco Use: High Risk (1/9/2024)    Patient History     Smoking Tobacco Use: Every Day     Smokeless Tobacco Use: Never     Passive Exposure: Current     E-cigarette/Vaping       Questions Responses    E-cigarette/Vaping Use Never User    Start Date     Passive Exposure     Quit Date     Counseling Given     Comments         Interventions:  Patient  Pt very anxious this morning, even PRN Anxiety medication. Stating \"All you want to do is drug me and kill me\" Attempted to education Pt on medication, scheduled and PRN, the importance and purpose of each. Pt refusing to wear home bipap, O2 dropping but recovers when she does leave mask on. RT at bedside, Treatment given. Pt called  stating \" come fix this machine, they don't know what they are doing\". Emotional support and education given, unsuccessfully. Pt is very agitated, has been resistant to care through shift. Refused morning labs and vitals,  is on the way.

## 2024-03-21 DIAGNOSIS — E78.2 MIXED HYPERLIPIDEMIA: ICD-10-CM

## 2024-03-21 DIAGNOSIS — E03.9 ACQUIRED HYPOTHYROIDISM: ICD-10-CM

## 2024-03-21 DIAGNOSIS — I10 PRIMARY HYPERTENSION: ICD-10-CM

## 2024-03-21 LAB
ABSOLUTE IMMATURE GRANULOCYTE: <0.03 K/UL (ref 0–0.58)
ALBUMIN SERPL-MCNC: 4.5 G/DL (ref 3.5–5.2)
ALP BLD-CCNC: 67 U/L (ref 35–104)
ALT SERPL-CCNC: 63 U/L (ref 0–32)
ANION GAP SERPL CALCULATED.3IONS-SCNC: 15 MMOL/L (ref 7–16)
AST SERPL-CCNC: 54 U/L (ref 0–31)
BASOPHILS ABSOLUTE: 0.03 K/UL (ref 0–0.2)
BASOPHILS RELATIVE PERCENT: 1 % (ref 0–2)
BILIRUB SERPL-MCNC: 0.6 MG/DL (ref 0–1.2)
BUN BLDV-MCNC: 16 MG/DL (ref 6–23)
CALCIUM SERPL-MCNC: 9.5 MG/DL (ref 8.6–10.2)
CHLORIDE BLD-SCNC: 105 MMOL/L (ref 98–107)
CHOLESTEROL: 225 MG/DL
CO2: 23 MMOL/L (ref 22–29)
CREAT SERPL-MCNC: 0.7 MG/DL (ref 0.5–1)
CREATININE URINE: 187.6 MG/DL (ref 29–226)
EOSINOPHILS ABSOLUTE: 0.09 K/UL (ref 0.05–0.5)
EOSINOPHILS RELATIVE PERCENT: 2 % (ref 0–6)
GFR SERPL CREATININE-BSD FRML MDRD: >60 ML/MIN/1.73M2
GLUCOSE BLD-MCNC: 116 MG/DL (ref 74–99)
HCT VFR BLD CALC: 45.3 % (ref 34–48)
HDLC SERPL-MCNC: 42 MG/DL
HEMOGLOBIN: 15.3 G/DL (ref 11.5–15.5)
IMMATURE GRANULOCYTES: 0 % (ref 0–5)
LDL CHOLESTEROL: 146 MG/DL
LYMPHOCYTES ABSOLUTE: 1.16 K/UL (ref 1.5–4)
LYMPHOCYTES RELATIVE PERCENT: 25 % (ref 20–42)
MCH RBC QN AUTO: 32.1 PG (ref 26–35)
MCHC RBC AUTO-ENTMCNC: 33.8 G/DL (ref 32–34.5)
MCV RBC AUTO: 95 FL (ref 80–99.9)
MICROALBUMIN/CREAT 24H UR: <12 MG/L (ref 0–19)
MICROALBUMIN/CREAT UR-RTO: NORMAL MCG/MG CREAT (ref 0–30)
MONOCYTES ABSOLUTE: 0.44 K/UL (ref 0.1–0.95)
MONOCYTES RELATIVE PERCENT: 10 % (ref 2–12)
NEUTROPHILS ABSOLUTE: 2.87 K/UL (ref 1.8–7.3)
NEUTROPHILS RELATIVE PERCENT: 62 % (ref 43–80)
PDW BLD-RTO: 12.9 % (ref 11.5–15)
PLATELET # BLD: 121 K/UL (ref 130–450)
PMV BLD AUTO: 11.9 FL (ref 7–12)
POTASSIUM SERPL-SCNC: 4.3 MMOL/L (ref 3.5–5)
RBC # BLD: 4.77 M/UL (ref 3.5–5.5)
SODIUM BLD-SCNC: 143 MMOL/L (ref 132–146)
T4 FREE: 1.6 NG/DL (ref 0.9–1.7)
TOTAL PROTEIN: 7.2 G/DL (ref 6.4–8.3)
TRIGL SERPL-MCNC: 186 MG/DL
TSH SERPL DL<=0.05 MIU/L-ACNC: 0.46 UIU/ML (ref 0.27–4.2)
VLDLC SERPL CALC-MCNC: 37 MG/DL
WBC # BLD: 4.6 K/UL (ref 4.5–11.5)

## 2024-04-02 DIAGNOSIS — E03.9 ACQUIRED HYPOTHYROIDISM: ICD-10-CM

## 2024-04-02 RX ORDER — LEVOTHYROXINE SODIUM 112 UG/1
TABLET ORAL
Qty: 90 TABLET | Refills: 1 | OUTPATIENT
Start: 2024-04-02

## 2024-04-03 ENCOUNTER — OFFICE VISIT (OUTPATIENT)
Dept: FAMILY MEDICINE CLINIC | Age: 69
End: 2024-04-03
Payer: MEDICARE

## 2024-04-03 VITALS
HEIGHT: 69 IN | SYSTOLIC BLOOD PRESSURE: 150 MMHG | WEIGHT: 206.6 LBS | BODY MASS INDEX: 30.6 KG/M2 | HEART RATE: 75 BPM | DIASTOLIC BLOOD PRESSURE: 92 MMHG | OXYGEN SATURATION: 97 % | TEMPERATURE: 98.2 F

## 2024-04-03 DIAGNOSIS — H53.9 VISION CHANGES: ICD-10-CM

## 2024-04-03 DIAGNOSIS — G45.9 TIA (TRANSIENT ISCHEMIC ATTACK): ICD-10-CM

## 2024-04-03 DIAGNOSIS — R01.1 HEART MURMUR: ICD-10-CM

## 2024-04-03 DIAGNOSIS — E03.9 ACQUIRED HYPOTHYROIDISM: ICD-10-CM

## 2024-04-03 DIAGNOSIS — K59.09 OTHER CONSTIPATION: ICD-10-CM

## 2024-04-03 DIAGNOSIS — Z72.0 TOBACCO USE: ICD-10-CM

## 2024-04-03 DIAGNOSIS — I10 PRIMARY HYPERTENSION: Primary | ICD-10-CM

## 2024-04-03 DIAGNOSIS — R42 DIZZINESS: ICD-10-CM

## 2024-04-03 DIAGNOSIS — E78.2 MIXED HYPERLIPIDEMIA: ICD-10-CM

## 2024-04-03 PROCEDURE — 1123F ACP DISCUSS/DSCN MKR DOCD: CPT | Performed by: FAMILY MEDICINE

## 2024-04-03 PROCEDURE — 3077F SYST BP >= 140 MM HG: CPT | Performed by: FAMILY MEDICINE

## 2024-04-03 PROCEDURE — 99214 OFFICE O/P EST MOD 30 MIN: CPT | Performed by: FAMILY MEDICINE

## 2024-04-03 PROCEDURE — 3080F DIAST BP >= 90 MM HG: CPT | Performed by: FAMILY MEDICINE

## 2024-04-03 PROCEDURE — 93000 ELECTROCARDIOGRAM COMPLETE: CPT | Performed by: FAMILY MEDICINE

## 2024-04-03 PROCEDURE — G2211 COMPLEX E/M VISIT ADD ON: HCPCS | Performed by: FAMILY MEDICINE

## 2024-04-03 RX ORDER — TRIAMCINOLONE ACETONIDE 1 MG/G
CREAM TOPICAL
COMMUNITY
Start: 2024-03-12 | End: 2024-04-03

## 2024-04-03 RX ORDER — HYDROCHLOROTHIAZIDE 25 MG/1
25 TABLET ORAL EVERY MORNING
Qty: 90 TABLET | Refills: 0 | Status: SHIPPED | OUTPATIENT
Start: 2024-04-03

## 2024-04-03 RX ORDER — LEVOTHYROXINE SODIUM 112 UG/1
TABLET ORAL
Qty: 90 TABLET | Refills: 1 | Status: SHIPPED | OUTPATIENT
Start: 2024-04-03

## 2024-04-03 ASSESSMENT — ENCOUNTER SYMPTOMS
SHORTNESS OF BREATH: 0
TROUBLE SWALLOWING: 0
VOMITING: 0
EYE PAIN: 0
DIARRHEA: 0
CHEST TIGHTNESS: 0
WHEEZING: 0
BACK PAIN: 0
CONSTIPATION: 0
ABDOMINAL PAIN: 0
COUGH: 0
SINUS PAIN: 0

## 2024-04-03 NOTE — PROGRESS NOTES
4/3/24    Name: Nettie Sequeira  :1955   Sex:female   Age:68 y.o.    Chief Complaint   Patient presents with    Hypertension    Hypothyroidism    Dizziness     Patient presents to office for visit. She has been having dizzy spells and light headedness for the past month. She says the incidents are occurring more often which concerns her. Sometimes she will feel as if she is going faint or be sick. Patient says these spells are random and not correlated to any specific activity. Her watch was telling her that her blood pressure was low, but her watch is not accurate. The watch read her blood pressure at 126/62 and it is actually 150's over 100's. Patient says she does not feel as rested in the morning when she wakes up despite using her CPAP.     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 Negative for appetite change and fever.   HENT:  Negative for congestion, ear pain, sinus pain, sore throat and trouble swallowing.    Eyes:  Negative for pain.   Respiratory:  Negative for cough, chest

## 2024-04-30 ENCOUNTER — HOSPITAL ENCOUNTER (OUTPATIENT)
Dept: CT IMAGING | Age: 69
Discharge: HOME | End: 2024-05-02
Payer: MEDICARE

## 2024-04-30 DIAGNOSIS — H53.9 VISION CHANGES: ICD-10-CM

## 2024-04-30 DIAGNOSIS — G45.9 TIA (TRANSIENT ISCHEMIC ATTACK): ICD-10-CM

## 2024-04-30 DIAGNOSIS — R42 DIZZINESS: ICD-10-CM

## 2024-04-30 DIAGNOSIS — R01.1 HEART MURMUR: ICD-10-CM

## 2024-04-30 PROCEDURE — 70450 CT HEAD/BRAIN W/O DYE: CPT

## 2024-05-15 ENCOUNTER — OFFICE VISIT (OUTPATIENT)
Dept: FAMILY MEDICINE CLINIC | Age: 69
End: 2024-05-15

## 2024-05-15 VITALS
SYSTOLIC BLOOD PRESSURE: 132 MMHG | OXYGEN SATURATION: 98 % | WEIGHT: 204.8 LBS | HEIGHT: 69 IN | BODY MASS INDEX: 30.33 KG/M2 | TEMPERATURE: 98.2 F | DIASTOLIC BLOOD PRESSURE: 82 MMHG | HEART RATE: 78 BPM

## 2024-05-15 DIAGNOSIS — Z72.0 TOBACCO USE: ICD-10-CM

## 2024-05-15 DIAGNOSIS — E78.2 MIXED HYPERLIPIDEMIA: ICD-10-CM

## 2024-05-15 DIAGNOSIS — R42 DIZZINESS: ICD-10-CM

## 2024-05-15 DIAGNOSIS — J30.1 SEASONAL ALLERGIC RHINITIS DUE TO POLLEN: ICD-10-CM

## 2024-05-15 DIAGNOSIS — I10 PRIMARY HYPERTENSION: Primary | ICD-10-CM

## 2024-05-15 DIAGNOSIS — E03.9 ACQUIRED HYPOTHYROIDISM: ICD-10-CM

## 2024-05-15 RX ORDER — TRIAMCINOLONE ACETONIDE 1 MG/G
OINTMENT TOPICAL
COMMUNITY
Start: 2024-04-23

## 2024-05-15 RX ORDER — HYDROCHLOROTHIAZIDE 25 MG/1
25 TABLET ORAL EVERY MORNING
Qty: 90 TABLET | Refills: 1 | Status: SHIPPED | OUTPATIENT
Start: 2024-05-15

## 2024-05-15 RX ORDER — LEVOTHYROXINE SODIUM 112 UG/1
TABLET ORAL
Qty: 90 TABLET | Refills: 1 | Status: SHIPPED | OUTPATIENT
Start: 2024-05-15

## 2024-05-15 RX ORDER — MONTELUKAST SODIUM 10 MG/1
10 TABLET ORAL DAILY
Qty: 90 TABLET | Refills: 3 | Status: SHIPPED | OUTPATIENT
Start: 2024-05-15

## 2024-05-15 RX ORDER — ALBUTEROL SULFATE 90 UG/1
2 AEROSOL, METERED RESPIRATORY (INHALATION) 4 TIMES DAILY PRN
Qty: 18 G | Refills: 1 | Status: SHIPPED | OUTPATIENT
Start: 2024-05-15

## 2024-05-15 RX ORDER — LORATADINE 10 MG/1
10 TABLET ORAL DAILY
COMMUNITY

## 2024-05-15 ASSESSMENT — ENCOUNTER SYMPTOMS
TROUBLE SWALLOWING: 0
SINUS PAIN: 0
COUGH: 0
WHEEZING: 0
BACK PAIN: 0
VOMITING: 0
CONSTIPATION: 0
SORE THROAT: 0
NAUSEA: 0
ABDOMINAL PAIN: 0
EYE PAIN: 0
SHORTNESS OF BREATH: 0
CHEST TIGHTNESS: 0

## 2024-05-15 NOTE — PROGRESS NOTES
5/15/24    Name: Nettie Sequeira  :1955   Sex:female   Age:68 y.o.    Chief Complaint   Patient presents with    Hypertension    Dizziness     Patient presents to office for visit. She started taking Claritin and is feeling less dizzy. Patient says her blood pressure readings have improved as well. Her allergist told her to take Claritin 4 times a day for her itchy skin but patient is hesitant to do this. Patient denies any specific concerns. She would like refill of albuterol because the pine trees will start pollinating next month.     Here for recheck on blood pressures  They are doing better with her bp cuff  130-140 systolic  She is taking the hctz 25mg daily  Continue to encouraged to decrease tobacco use  Healthy diet, low salt    Allergies and dizziness  She is on claritin 1 to 2 a day and this has been helping with dizziness  She can also use lfonase and we discussed singular, she would like to try it  Sent in for her 1 daily at night    Dizziness-getting better with claritin  Carotid us and ct head are unremarkable  No issues there  She has ECHO scheduled in     NEEMA  She is seeing sleep medicaine  Did advise that fatigue and sleep can take a while to improve 6 to 12 months some times          Review of Systems   Constitutional:  Negative for appetite change, fatigue and fever.   HENT:  Negative for congestion, ear pain, sinus pain, sore throat and trouble swallowing.    Eyes:  Negative for pain.   Respiratory:  Negative for cough, chest tightness, shortness of breath and wheezing.    Cardiovascular:  Negative for chest pain, palpitations and leg swelling.   Gastrointestinal:  Negative for abdominal pain, constipation, diarrhea, nausea and vomiting.   Endocrine: Negative for cold intolerance and heat intolerance.   Genitourinary:  Negative for difficulty urinating, hematuria and pelvic pain.   Musculoskeletal:  Negative for back pain, gait problem and joint swelling.   Skin:  Negative for rash

## 2024-06-12 ENCOUNTER — HOSPITAL ENCOUNTER (OUTPATIENT)
Dept: CARDIOLOGY | Age: 69
Discharge: HOME | End: 2024-06-14
Payer: MEDICARE

## 2024-06-12 VITALS
BODY MASS INDEX: 30.21 KG/M2 | WEIGHT: 204 LBS | SYSTOLIC BLOOD PRESSURE: 132 MMHG | HEIGHT: 69 IN | DIASTOLIC BLOOD PRESSURE: 82 MMHG

## 2024-06-12 DIAGNOSIS — R01.1 HEART MURMUR: ICD-10-CM

## 2024-06-12 DIAGNOSIS — H53.9 VISION CHANGES: ICD-10-CM

## 2024-06-12 DIAGNOSIS — R42 DIZZINESS: ICD-10-CM

## 2024-06-12 PROCEDURE — 93306 TTE W/DOPPLER COMPLETE: CPT

## 2024-06-13 LAB
ECHO AO ASC DIAM: 2.5 CM
ECHO AO ASCENDING AORTA INDEX: 1.2 CM/M2
ECHO AV AREA PEAK VELOCITY: 1.3 CM2
ECHO AV AREA VTI: 1.6 CM2
ECHO AV AREA/BSA PEAK VELOCITY: 0.6 CM2/M2
ECHO AV AREA/BSA VTI: 0.8 CM2/M2
ECHO AV CUSP MM: 1.5 CM
ECHO AV MEAN GRADIENT: 18 MMHG
ECHO AV MEAN VELOCITY: 2 M/S
ECHO AV PEAK GRADIENT: 35 MMHG
ECHO AV PEAK VELOCITY: 3 M/S
ECHO AV VELOCITY RATIO: 0.4
ECHO AV VTI: 63.4 CM
ECHO BSA: 2.12 M2
ECHO LA DIAMETER INDEX: 1.68 CM/M2
ECHO LA DIAMETER: 3.5 CM
ECHO LA VOL A-L A2C: 33 ML (ref 22–52)
ECHO LA VOL A-L A4C: 32 ML (ref 22–52)
ECHO LA VOL MOD A2C: 30 ML (ref 22–52)
ECHO LA VOL MOD A4C: 28 ML (ref 22–52)
ECHO LA VOLUME AREA LENGTH: 32 ML
ECHO LA VOLUME INDEX A-L A2C: 16 ML/M2 (ref 16–34)
ECHO LA VOLUME INDEX A-L A4C: 15 ML/M2 (ref 16–34)
ECHO LA VOLUME INDEX AREA LENGTH: 15 ML/M2 (ref 16–34)
ECHO LA VOLUME INDEX MOD A2C: 14 ML/M2 (ref 16–34)
ECHO LA VOLUME INDEX MOD A4C: 13 ML/M2 (ref 16–34)
ECHO LV FRACTIONAL SHORTENING: 35 % (ref 28–44)
ECHO LV INTERNAL DIMENSION DIASTOLE INDEX: 2.31 CM/M2
ECHO LV INTERNAL DIMENSION DIASTOLIC: 4.8 CM (ref 3.9–5.3)
ECHO LV INTERNAL DIMENSION SYSTOLIC INDEX: 1.49 CM/M2
ECHO LV INTERNAL DIMENSION SYSTOLIC: 3.1 CM
ECHO LV ISOVOLUMETRIC RELAXATION TIME (IVRT): 65.7 MS
ECHO LV IVSD: 1.1 CM (ref 0.6–0.9)
ECHO LV IVSS: 1.3 CM
ECHO LV MASS 2D: 170.2 G (ref 67–162)
ECHO LV MASS INDEX 2D: 81.8 G/M2 (ref 43–95)
ECHO LV POSTERIOR WALL DIASTOLIC: 0.9 CM (ref 0.6–0.9)
ECHO LV POSTERIOR WALL SYSTOLIC: 1.2 CM
ECHO LV RELATIVE WALL THICKNESS RATIO: 0.38
ECHO LVOT AREA: 3.5 CM2
ECHO LVOT AV VTI INDEX: 0.5
ECHO LVOT DIAM: 2.1 CM
ECHO LVOT MEAN GRADIENT: 4 MMHG
ECHO LVOT PEAK GRADIENT: 6 MMHG
ECHO LVOT PEAK VELOCITY: 1.2 M/S
ECHO LVOT STROKE VOLUME INDEX: 52.4 ML/M2
ECHO LVOT SV: 109 ML
ECHO LVOT VTI: 31.5 CM
ECHO MV "A" WAVE DURATION: 161.5 MSEC
ECHO MV A VELOCITY: 0.84 M/S
ECHO MV AREA PHT: 2.9 CM2
ECHO MV AREA VTI: 3.6 CM2
ECHO MV E DECELERATION TIME (DT): 256.1 MS
ECHO MV E VELOCITY: 0.95 M/S
ECHO MV E/A RATIO: 1.13
ECHO MV LVOT VTI INDEX: 0.96
ECHO MV MAX VELOCITY: 1.1 M/S
ECHO MV MEAN GRADIENT: 2 MMHG
ECHO MV MEAN VELOCITY: 0.7 M/S
ECHO MV PEAK GRADIENT: 5 MMHG
ECHO MV PRESSURE HALF TIME (PHT): 74.7 MS
ECHO MV VTI: 30.1 CM
ECHO PV MAX VELOCITY: 0.9 M/S
ECHO PV MEAN GRADIENT: 2 MMHG
ECHO PV MEAN VELOCITY: 0.7 M/S
ECHO PV PEAK GRADIENT: 3 MMHG
ECHO PV VTI: 19.3 CM
ECHO PVEIN A DURATION: 129.2 MS
ECHO PVEIN A VELOCITY: 0.3 M/S
ECHO PVEIN PEAK D VELOCITY: 0.5 M/S
ECHO PVEIN PEAK S VELOCITY: 0.6 M/S
ECHO PVEIN S/D RATIO: 1.2
ECHO RV INTERNAL DIMENSION: 3 CM
ECHO TV REGURGITANT MAX VELOCITY: 2.2 M/S
ECHO TV REGURGITANT PEAK GRADIENT: 19 MMHG

## 2024-09-04 DIAGNOSIS — I10 PRIMARY HYPERTENSION: ICD-10-CM

## 2024-09-04 DIAGNOSIS — E78.2 MIXED HYPERLIPIDEMIA: ICD-10-CM

## 2024-09-04 DIAGNOSIS — E03.9 ACQUIRED HYPOTHYROIDISM: ICD-10-CM

## 2024-09-04 LAB
ALBUMIN: 4.4 G/DL (ref 3.5–5.2)
ALP BLD-CCNC: 80 U/L (ref 35–104)
ALT SERPL-CCNC: 52 U/L (ref 0–32)
ANION GAP SERPL CALCULATED.3IONS-SCNC: 12 MMOL/L (ref 7–16)
AST SERPL-CCNC: 46 U/L (ref 0–31)
BASOPHILS ABSOLUTE: 0.03 K/UL (ref 0–0.2)
BASOPHILS RELATIVE PERCENT: 1 % (ref 0–2)
BILIRUB SERPL-MCNC: 0.4 MG/DL (ref 0–1.2)
BUN BLDV-MCNC: 10 MG/DL (ref 6–23)
CALCIUM SERPL-MCNC: 9.4 MG/DL (ref 8.6–10.2)
CHLORIDE BLD-SCNC: 105 MMOL/L (ref 98–107)
CHOLESTEROL, TOTAL: 231 MG/DL
CO2: 25 MMOL/L (ref 22–29)
CREAT SERPL-MCNC: 0.7 MG/DL (ref 0.5–1)
EOSINOPHILS ABSOLUTE: 0.11 K/UL (ref 0.05–0.5)
EOSINOPHILS RELATIVE PERCENT: 2 % (ref 0–6)
GFR, ESTIMATED: >90 ML/MIN/1.73M2
GLUCOSE BLD-MCNC: 101 MG/DL (ref 74–99)
HCT VFR BLD CALC: 44.8 % (ref 34–48)
HDLC SERPL-MCNC: 40 MG/DL
HEMOGLOBIN: 14.7 G/DL (ref 11.5–15.5)
IMMATURE GRANULOCYTES %: 0 % (ref 0–5)
IMMATURE GRANULOCYTES ABSOLUTE: <0.03 K/UL (ref 0–0.58)
LDL CHOLESTEROL: 150 MG/DL
LYMPHOCYTES ABSOLUTE: 1.36 K/UL (ref 1.5–4)
LYMPHOCYTES RELATIVE PERCENT: 26 % (ref 20–42)
MCH RBC QN AUTO: 32.5 PG (ref 26–35)
MCHC RBC AUTO-ENTMCNC: 32.8 G/DL (ref 32–34.5)
MCV RBC AUTO: 99.1 FL (ref 80–99.9)
MONOCYTES ABSOLUTE: 0.53 K/UL (ref 0.1–0.95)
MONOCYTES RELATIVE PERCENT: 10 % (ref 2–12)
NEUTROPHILS ABSOLUTE: 3.27 K/UL (ref 1.8–7.3)
NEUTROPHILS RELATIVE PERCENT: 61 % (ref 43–80)
PDW BLD-RTO: 12.3 % (ref 11.5–15)
PLATELET # BLD: 117 K/UL (ref 130–450)
PMV BLD AUTO: 13 FL (ref 7–12)
POTASSIUM SERPL-SCNC: 5.1 MMOL/L (ref 3.5–5)
RBC # BLD: 4.52 M/UL (ref 3.5–5.5)
SODIUM BLD-SCNC: 142 MMOL/L (ref 132–146)
TOTAL PROTEIN: 7.2 G/DL (ref 6.4–8.3)
TRIGL SERPL-MCNC: 205 MG/DL
TSH SERPL DL<=0.05 MIU/L-ACNC: 0.64 UIU/ML (ref 0.27–4.2)
VLDLC SERPL CALC-MCNC: 41 MG/DL
WBC # BLD: 5.3 K/UL (ref 4.5–11.5)

## 2024-09-08 SDOH — ECONOMIC STABILITY: FOOD INSECURITY: WITHIN THE PAST 12 MONTHS, YOU WORRIED THAT YOUR FOOD WOULD RUN OUT BEFORE YOU GOT MONEY TO BUY MORE.: SOMETIMES TRUE

## 2024-09-08 SDOH — ECONOMIC STABILITY: FOOD INSECURITY: WITHIN THE PAST 12 MONTHS, THE FOOD YOU BOUGHT JUST DIDN'T LAST AND YOU DIDN'T HAVE MONEY TO GET MORE.: SOMETIMES TRUE

## 2024-09-08 SDOH — ECONOMIC STABILITY: INCOME INSECURITY: HOW HARD IS IT FOR YOU TO PAY FOR THE VERY BASICS LIKE FOOD, HOUSING, MEDICAL CARE, AND HEATING?: PATIENT DECLINED

## 2024-09-11 ENCOUNTER — OFFICE VISIT (OUTPATIENT)
Dept: FAMILY MEDICINE CLINIC | Age: 69
End: 2024-09-11

## 2024-09-11 VITALS
HEIGHT: 69 IN | TEMPERATURE: 97.9 F | OXYGEN SATURATION: 98 % | HEART RATE: 78 BPM | BODY MASS INDEX: 30.1 KG/M2 | DIASTOLIC BLOOD PRESSURE: 88 MMHG | WEIGHT: 203.2 LBS | SYSTOLIC BLOOD PRESSURE: 138 MMHG

## 2024-09-11 DIAGNOSIS — E78.2 MIXED HYPERLIPIDEMIA: ICD-10-CM

## 2024-09-11 DIAGNOSIS — Z72.0 TOBACCO USE: ICD-10-CM

## 2024-09-11 DIAGNOSIS — I10 PRIMARY HYPERTENSION: Primary | ICD-10-CM

## 2024-09-11 DIAGNOSIS — Z12.31 ENCOUNTER FOR SCREENING MAMMOGRAM FOR BREAST CANCER: ICD-10-CM

## 2024-09-11 DIAGNOSIS — E03.9 ACQUIRED HYPOTHYROIDISM: ICD-10-CM

## 2024-09-11 RX ORDER — PRAVASTATIN SODIUM 80 MG/1
80 TABLET ORAL DAILY
Qty: 30 TABLET | Refills: 3 | Status: SHIPPED | OUTPATIENT
Start: 2024-09-11

## 2024-09-11 RX ORDER — LEVOTHYROXINE SODIUM 112 UG/1
TABLET ORAL
Qty: 90 TABLET | Refills: 1 | Status: SHIPPED | OUTPATIENT
Start: 2024-09-11

## 2024-09-11 RX ORDER — ATENOLOL 25 MG/1
25 TABLET ORAL DAILY
Qty: 30 TABLET | Refills: 3 | Status: SHIPPED | OUTPATIENT
Start: 2024-09-11

## 2024-09-11 ASSESSMENT — ENCOUNTER SYMPTOMS
SHORTNESS OF BREATH: 0
BACK PAIN: 0
TROUBLE SWALLOWING: 0
CHEST TIGHTNESS: 0
VOMITING: 0
COUGH: 0
ABDOMINAL PAIN: 0
NAUSEA: 0
SINUS PAIN: 0
EYE PAIN: 0
WHEEZING: 0
SORE THROAT: 0
DIARRHEA: 0
CONSTIPATION: 0

## 2024-10-30 ENCOUNTER — TELEPHONE (OUTPATIENT)
Dept: PHARMACY | Facility: CLINIC | Age: 69
End: 2024-10-30

## 2024-10-30 NOTE — TELEPHONE ENCOUNTER
[facility] CLINICAL PHARMACY: ADHERENCE REVIEW  Identified care gap per [employer]: fills at [facility]: Statin adherence    ASSESSMENT  STATIN ADHERENCE    Insurance Records claims through 10/21/24 (Prior Year PDC = not reported; YTD PDC = 75%; Potential Fail Date: 24):   PRAVASTATIN TAB 80MG  last filled on 24 for 30 day supply. Next refill due: 10.12.24    Prescribed si tablet/capsule daily    Per Insurer Portal: last filled on same    Lab Results   Component Value Date    CHOL 231 (H) 2024    TRIG 205 (H) 2024    HDL 40 (L) 2024     Lab Results   Component Value Date     (H) 2024      ALT   Date Value Ref Range Status   2024 52 (H) 0 - 32 U/L Final     AST   Date Value Ref Range Status   2024 46 (H) 0 - 31 U/L Final     The 10-year ASCVD risk score (Jenni CHANG, et al., 2019) is: 21.1%    Values used to calculate the score:      Age: 69 years      Sex: Female      Is Non- : No      Diabetic: No      Tobacco smoker: Yes      Systolic Blood Pressure: 138 mmHg      Is BP treated: Yes      HDL Cholesterol: 40 mg/dL      Total Cholesterol: 231 mg/dL       The following are interventions that have been identified:   Patient OVERDUE refilling Pravastatin and active on home medication list.     Reached patient to review. Identified Barriers: Patient stated she had a couple new meds started and has not started Pravastatin yet. She wanted to make sure no reaction to the other new blood pressure meds before starting Pravastatin. She did have a reaction to Atenolol is going to speak with provider then go from there. She also mentioned she has had trouble with several statins already.  Told her to make sure she speaks with her provider. She thanked me for calling.    Verified medication instructions and Education provided.      Last Visit: 24  Next Visit: 25        Emily Peraza CPhT  [facility] Clinical   [place]

## 2024-11-05 ENCOUNTER — TELEPHONE (OUTPATIENT)
Dept: FAMILY MEDICINE CLINIC | Age: 69
End: 2024-11-05

## 2024-11-05 RX ORDER — SPIRONOLACTONE 25 MG/1
25 TABLET ORAL EVERY MORNING
Qty: 30 TABLET | Refills: 2 | Status: SHIPPED | OUTPATIENT
Start: 2024-11-05

## 2024-11-05 NOTE — TELEPHONE ENCOUNTER
Patient calling to get a change in her BP.   Jaw, neck and shoulder pain reported- she stopped this about 1 week ago and pain has gone away. She states its the Atenolol.   /81 this morning.  Pharmacy- [facility] in [place]

## 2024-11-05 NOTE — TELEPHONE ENCOUNTER
Patient updated with the medication change.   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 She reports still itching and seeing pepper in her stool. She is wondering if she needs to see Infectious Disease?

## 2024-11-05 NOTE — TELEPHONE ENCOUNTER
I sent in spironolactone, she can give that one a try, start with 1/2 pill daily in the morning and after a week increase to one pill    Thank you

## 2025-01-07 ENCOUNTER — OFFICE VISIT (OUTPATIENT)
Dept: FAMILY MEDICINE CLINIC | Age: 70
End: 2025-01-07
Payer: MEDICARE

## 2025-01-07 ENCOUNTER — OFFICE VISIT (OUTPATIENT)
Dept: FAMILY MEDICINE CLINIC | Age: 70
End: 2025-01-07

## 2025-01-07 VITALS
HEART RATE: 78 BPM | OXYGEN SATURATION: 96 % | SYSTOLIC BLOOD PRESSURE: 138 MMHG | WEIGHT: 209.6 LBS | TEMPERATURE: 97.9 F | HEIGHT: 69 IN | DIASTOLIC BLOOD PRESSURE: 88 MMHG | BODY MASS INDEX: 31.04 KG/M2

## 2025-01-07 VITALS
BODY MASS INDEX: 31.04 KG/M2 | DIASTOLIC BLOOD PRESSURE: 90 MMHG | OXYGEN SATURATION: 96 % | SYSTOLIC BLOOD PRESSURE: 142 MMHG | TEMPERATURE: 97.9 F | WEIGHT: 209.6 LBS | HEART RATE: 78 BPM | HEIGHT: 69 IN

## 2025-01-07 DIAGNOSIS — I10 PRIMARY HYPERTENSION: Primary | ICD-10-CM

## 2025-01-07 DIAGNOSIS — Z00.00 MEDICARE ANNUAL WELLNESS VISIT, SUBSEQUENT: Primary | ICD-10-CM

## 2025-01-07 DIAGNOSIS — I10 PRIMARY HYPERTENSION: ICD-10-CM

## 2025-01-07 DIAGNOSIS — L30.9 ECZEMA, UNSPECIFIED TYPE: ICD-10-CM

## 2025-01-07 DIAGNOSIS — E78.2 MIXED HYPERLIPIDEMIA: ICD-10-CM

## 2025-01-07 DIAGNOSIS — Z72.0 TOBACCO USE: ICD-10-CM

## 2025-01-07 DIAGNOSIS — E03.9 ACQUIRED HYPOTHYROIDISM: ICD-10-CM

## 2025-01-07 PROCEDURE — 1159F MED LIST DOCD IN RCRD: CPT | Performed by: FAMILY MEDICINE

## 2025-01-07 PROCEDURE — 3079F DIAST BP 80-89 MM HG: CPT | Performed by: FAMILY MEDICINE

## 2025-01-07 PROCEDURE — 1123F ACP DISCUSS/DSCN MKR DOCD: CPT | Performed by: FAMILY MEDICINE

## 2025-01-07 PROCEDURE — G0439 PPPS, SUBSEQ VISIT: HCPCS | Performed by: FAMILY MEDICINE

## 2025-01-07 PROCEDURE — 1160F RVW MEDS BY RX/DR IN RCRD: CPT | Performed by: FAMILY MEDICINE

## 2025-01-07 PROCEDURE — 3075F SYST BP GE 130 - 139MM HG: CPT | Performed by: FAMILY MEDICINE

## 2025-01-07 RX ORDER — LEVOTHYROXINE SODIUM 112 UG/1
TABLET ORAL
Qty: 90 TABLET | Refills: 1 | Status: SHIPPED | OUTPATIENT
Start: 2025-01-07

## 2025-01-07 RX ORDER — SPIRONOLACTONE 25 MG/1
25 TABLET ORAL EVERY MORNING
Qty: 90 TABLET | Refills: 1 | Status: SHIPPED | OUTPATIENT
Start: 2025-01-07

## 2025-01-07 ASSESSMENT — PATIENT HEALTH QUESTIONNAIRE - PHQ9
8. MOVING OR SPEAKING SO SLOWLY THAT OTHER PEOPLE COULD HAVE NOTICED. OR THE OPPOSITE, BEING SO FIGETY OR RESTLESS THAT YOU HAVE BEEN MOVING AROUND A LOT MORE THAN USUAL: NOT AT ALL
SUM OF ALL RESPONSES TO PHQ9 QUESTIONS 1 & 2: 0
3. TROUBLE FALLING OR STAYING ASLEEP: NOT AT ALL
SUM OF ALL RESPONSES TO PHQ QUESTIONS 1-9: 0
10. IF YOU CHECKED OFF ANY PROBLEMS, HOW DIFFICULT HAVE THESE PROBLEMS MADE IT FOR YOU TO DO YOUR WORK, TAKE CARE OF THINGS AT HOME, OR GET ALONG WITH OTHER PEOPLE: NOT DIFFICULT AT ALL
2. FEELING DOWN, DEPRESSED OR HOPELESS: NOT AT ALL
9. THOUGHTS THAT YOU WOULD BE BETTER OFF DEAD, OR OF HURTING YOURSELF: NOT AT ALL
7. TROUBLE CONCENTRATING ON THINGS, SUCH AS READING THE NEWSPAPER OR WATCHING TELEVISION: NOT AT ALL
5. POOR APPETITE OR OVEREATING: NOT AT ALL
4. FEELING TIRED OR HAVING LITTLE ENERGY: NOT AT ALL
6. FEELING BAD ABOUT YOURSELF - OR THAT YOU ARE A FAILURE OR HAVE LET YOURSELF OR YOUR FAMILY DOWN: NOT AT ALL
1. LITTLE INTEREST OR PLEASURE IN DOING THINGS: NOT AT ALL
SUM OF ALL RESPONSES TO PHQ QUESTIONS 1-9: 0

## 2025-01-07 ASSESSMENT — ENCOUNTER SYMPTOMS
WHEEZING: 0
SHORTNESS OF BREATH: 0
SINUS PAIN: 0
VOMITING: 0
DIARRHEA: 0
COUGH: 0
NAUSEA: 0
BACK PAIN: 0
CONSTIPATION: 0
TROUBLE SWALLOWING: 0
SORE THROAT: 0
CHEST TIGHTNESS: 0
ABDOMINAL PAIN: 0
EYE PAIN: 0

## 2025-01-07 ASSESSMENT — LIFESTYLE VARIABLES
HOW MANY STANDARD DRINKS CONTAINING ALCOHOL DO YOU HAVE ON A TYPICAL DAY: 1 OR 2
HOW OFTEN DO YOU HAVE A DRINK CONTAINING ALCOHOL: 2-4 TIMES A MONTH

## 2025-01-07 NOTE — PATIENT INSTRUCTIONS
lung cancers early, before a person has any symptoms of the cancer.  Lung cancer screening may help those who have the highest risk for lung cancer--people age 50 and older who are or were heavy smokers. For most people, who aren't at increased risk, screening for lung cancer probably isn't helpful.  Screening won't prevent cancer. And it may not find all lung cancers. Lung cancer screening may lower the risk of dying from lung cancer in a small number of people.  How is it done?  Lung cancer screening is done with a low-dose CT (computed tomography) scan. A CT scan uses X-rays, or radiation, to make detailed pictures of your body. Experts recommend that screening be done in medical centers that focus on finding and treating lung cancer.  Who is screening recommended for?  Lung cancer screening is recommended for people age 50 and older who are or were heavy smokers. That means people with a smoking history of at least 20 pack years. A pack year is a way to measure how heavy a smoker you are or were.  To figure out your pack years, multiply how many packs a day on average (assuming 20 cigarettes per pack) you have smoked by how many years you have smoked. For example:  If you smoked 1 pack a day for 20 years, that's 1 times 20. So you have a smoking history of 20 pack years.  If you smoked 2 packs a day for 10 years, that's 2 times 10. So you have a smoking history of 20 pack years.  Experts agree that screening is for people who have a high risk of lung cancer. But experts don't agree on what high risk means. Some say people age 50 or older with at least a 20-pack-year smoking history are high risk. Others say it's people age 55 or older with a 30-pack-year history.  To see if you could benefit from screening, first find out if you are at high risk for lung cancer. Your doctor can help you decide your lung cancer risk.  What are the risks of screening?  CT screening for lung cancer isn't perfect. It can show an

## 2025-01-07 NOTE — PROGRESS NOTES
25    Name: Nettie Sequeira  :1955   Sex:female   Age:69 y.o.    Chief Complaint   Patient presents with    Hypertension     Patient presents to office for visit. She is taking Spironolactone and says she gets a sharp pain in the top of her head when she takes it, but then it goes away immediately. She has not started pravastatin. Patient is taking levothyroxine. She took the spironolactone at 3 pm today.     Here for check up  HTN  Has been major issues for her finding one she tolerates  On spironolactone right onow  Just took it before coming in today though  Will get cmp today to check electrolytes    Hyperlipidemia  Has pravastatin but has not started it yet  She is thnking about it    Rash on arms  Started about a week sor so ago, when it got cold  It is c/w eczema, she has had it in the past  Reviewed treatment with her, dove soap, tempid showers, more fluids, cera ve cream to moisturize after every shower, tide free,     Tobacco use  Decliens vaccines, declines lung screening'  She is not ready to quit yet          Review of Systems   Constitutional:  Negative for appetite change, fatigue and fever.   HENT:  Negative for congestion, ear pain, sinus pain, sore throat and trouble swallowing.    Eyes:  Negative for pain.   Respiratory:  Negative for cough, chest tightness, shortness of breath and wheezing.    Cardiovascular:  Negative for chest pain, palpitations and leg swelling.   Gastrointestinal:  Negative for abdominal pain, constipation, diarrhea, nausea and vomiting.   Endocrine: Negative for cold intolerance and heat intolerance.   Genitourinary:  Negative for difficulty urinating, hematuria and pelvic pain.   Musculoskeletal:  Negative for back pain, gait problem and joint swelling.   Skin:  Negative for rash and wound.   Neurological:  Negative for dizziness, syncope and headaches.   Hematological:  Negative for adenopathy.   Psychiatric/Behavioral:  Negative for confusion, sleep disturbance

## 2025-01-07 NOTE — PROGRESS NOTES
Medicare Annual Wellness Visit    Nettie Sequeira is here for Medicare AWV    Assessment & Plan   Medicare annual wellness visit, subsequent       Return for Medicare Annual Wellness Visit in 1 year.     Subjective   The following acute and/or chronic problems were also addressed today:  Screenings, vaccines and advance care plans    Patient's complete Health Risk Assessment and screening values have been reviewed and are found in Flowsheets. The following problems were reviewed today and where indicated follow up appointments were made and/or referrals ordered.    Positive Risk Factor Screenings with Interventions:               Poor Eating Habits/Diet:  Do you eat balanced/healthy meals regularly?: (!) No  Interventions:  Patient declines any further evaluation or treatment    Abnormal BMI (obese):  Body mass index is 30.95 kg/m². (!) Abnormal  Interventions:  Patient declines any further evaluation or treatment             Advanced Directives:  Do you have a Living Will?: (!) No    Intervention:  has NO advanced directive - information provided        Tobacco Use:    Tobacco Use      Smoking status: Every Day        Packs/day: 1.50        Years: 1.5 packs/day for 23.0 years (34.5 ttl pk-yrs)        Types: Cigarettes        Start date: 2002        Passive exposure: Current      Smokeless tobacco: Never     Interventions:  Patient declined any further intervention or treatment                      Objective   Vitals:    01/07/25 1618   BP: 138/88   Pulse: 78   Temp: 97.9 °F (36.6 °C)   SpO2: 96%   Weight: 95.1 kg (209 lb 9.6 oz)   Height: 1.753 m (5' 9\")      Body mass index is 30.95 kg/m².      General Appearance: alert and oriented to person, place and time, well developed and well- nourished, in no acute distress  Skin: warm and dry, no rash or erythema  Head: normocephalic and atraumatic  Eyes: pupils equal, round, and reactive to light, extraocular eye movements intact, conjunctivae normal  ENT: tympanic

## 2025-01-08 LAB
ALBUMIN: 4.7 G/DL (ref 3.5–5.2)
ALP BLD-CCNC: 85 U/L (ref 35–104)
ALT SERPL-CCNC: 74 U/L (ref 0–32)
ANION GAP SERPL CALCULATED.3IONS-SCNC: 14 MMOL/L (ref 7–16)
AST SERPL-CCNC: 60 U/L (ref 0–31)
BILIRUB SERPL-MCNC: 0.2 MG/DL (ref 0–1.2)
BUN BLDV-MCNC: 13 MG/DL (ref 6–23)
CALCIUM SERPL-MCNC: 9.6 MG/DL (ref 8.6–10.2)
CHLORIDE BLD-SCNC: 104 MMOL/L (ref 98–107)
CO2: 23 MMOL/L (ref 22–29)
CREAT SERPL-MCNC: 0.8 MG/DL (ref 0.5–1)
GFR, ESTIMATED: 81 ML/MIN/1.73M2
GLUCOSE BLD-MCNC: 124 MG/DL (ref 74–99)
POTASSIUM SERPL-SCNC: 4.5 MMOL/L (ref 3.5–5)
SODIUM BLD-SCNC: 141 MMOL/L (ref 132–146)
TOTAL PROTEIN: 7.6 G/DL (ref 6.4–8.3)

## 2025-04-07 ENCOUNTER — TELEPHONE (OUTPATIENT)
Dept: FAMILY MEDICINE CLINIC | Age: 70
End: 2025-04-07

## 2025-04-07 DIAGNOSIS — I10 PRIMARY HYPERTENSION: ICD-10-CM

## 2025-04-07 DIAGNOSIS — E03.9 ACQUIRED HYPOTHYROIDISM: Primary | ICD-10-CM

## 2025-07-11 ENCOUNTER — TRANSCRIBE ORDERS (OUTPATIENT)
Dept: ADMINISTRATIVE | Age: 70
End: 2025-07-11

## 2025-07-11 DIAGNOSIS — R10.84 ABDOMINAL PAIN, GENERALIZED: Primary | ICD-10-CM

## 2025-07-11 DIAGNOSIS — K76.89 LIVER NODULE: ICD-10-CM

## 2025-07-11 DIAGNOSIS — R74.01 ELEVATED LIVER TRANSAMINASE LEVEL: ICD-10-CM

## (undated) DEVICE — ELECTRODE PT RET AD L9FT HI MOIST COND ADH HYDRGEL CORDED

## (undated) DEVICE — SPONGE GZ W4XL4IN RAYON POLY CVR W/NONWOVEN FAB STRL 2/PK

## (undated) DEVICE — FORCEPS BX L240CM JAW DIA2.4MM ORNG L CAP W/ NDL DISP RAD

## (undated) DEVICE — TRAP POLYP ETRAP

## (undated) DEVICE — SNARE ENDOSCP L240CM LOOP W13MM SHTH DIA2.4MM SM OVL FLX

## (undated) DEVICE — GRADUATE TRIANG MEASURE 1000ML BLK PRNT